# Patient Record
Sex: MALE | Race: WHITE | Employment: FULL TIME | URBAN - METROPOLITAN AREA
[De-identification: names, ages, dates, MRNs, and addresses within clinical notes are randomized per-mention and may not be internally consistent; named-entity substitution may affect disease eponyms.]

---

## 2017-01-27 ENCOUNTER — OFFICE VISIT (OUTPATIENT)
Dept: ORTHOPEDIC SURGERY | Age: 57
End: 2017-01-27

## 2017-01-27 DIAGNOSIS — S83.242D TEAR OF MEDIAL MENISCUS OF LEFT KNEE, UNSPECIFIED TEAR TYPE, UNSPECIFIED WHETHER OLD OR CURRENT TEAR, SUBSEQUENT ENCOUNTER: Primary | ICD-10-CM

## 2017-01-27 PROCEDURE — 99024 POSTOP FOLLOW-UP VISIT: CPT | Performed by: ORTHOPAEDIC SURGERY

## 2017-03-15 ENCOUNTER — OFFICE VISIT (OUTPATIENT)
Dept: ORTHOPEDIC SURGERY | Age: 57
End: 2017-03-15

## 2017-03-15 VITALS — BODY MASS INDEX: 38.36 KG/M2 | HEIGHT: 76 IN | WEIGHT: 315 LBS

## 2017-03-15 DIAGNOSIS — S83.241D OTHER TEAR OF MEDIAL MENISCUS OF RIGHT KNEE AS CURRENT INJURY, SUBSEQUENT ENCOUNTER: Primary | ICD-10-CM

## 2017-03-15 DIAGNOSIS — M17.10 ARTHRITIS OF KNEE: ICD-10-CM

## 2017-03-15 PROCEDURE — 99024 POSTOP FOLLOW-UP VISIT: CPT | Performed by: ORTHOPAEDIC SURGERY

## 2017-03-21 ENCOUNTER — HOSPITAL ENCOUNTER (OUTPATIENT)
Dept: OTHER | Age: 57
Discharge: HOME | End: 2017-03-21
Attending: INTERNAL MEDICINE | Admitting: INTERNAL MEDICINE

## 2017-03-21 DIAGNOSIS — R52 PAIN: ICD-10-CM

## 2017-03-21 DIAGNOSIS — J20.9 ACUTE BRONCHITIS, UNSPECIFIED ORGANISM: ICD-10-CM

## 2022-11-02 PROBLEM — E66.9 OBESITY: Status: ACTIVE | Noted: 2022-11-02

## 2022-11-02 PROBLEM — I48.91 NEW ONSET ATRIAL FIBRILLATION (HCC): Status: ACTIVE | Noted: 2022-11-02

## 2022-11-02 PROBLEM — E03.8 OTHER SPECIFIED HYPOTHYROIDISM: Status: ACTIVE | Noted: 2022-11-02

## 2022-11-02 PROBLEM — R29.818 SUSPECTED SLEEP APNEA: Status: ACTIVE | Noted: 2022-11-02

## 2022-11-02 NOTE — PROGRESS NOTES
[facility]   Electrophysiology Consultation   Date: 11/3/2022  Reason for Consultation: new onset atrial fibrillation   Consult Requesting Physician: Esme LEVI    Chief Complaint   Patient presents with    New Patient     Pt reports occassionally feeling palps, pt reports no other cardiac symptoms. Atrial Fibrillation    Other     Pt wants to know which pain med can be taken for headache while taking eliquis       CC: new atrial fibrillation    HPI: Beto Lucas is a 65 y.o. male with past medical history of DM, hypothyroid. Patient saw PCP for annual exam. New onset atrial fibrillation noted on EKG. . Patient was started on Eliquis . Interval History: Robinson Franks presents today to discuss his new atrial  fibrillation. 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 He does have palpitations that he has noticed are triggered by caffeine. He does fall asleep at work. He drinks very little. He states he diabetes Is not well controlled. He is having headaches that he thins may be due eliquis       Assessment and plan:   New onset atrial fibrillation. EKG today  simus rhythm      New noted at PCP office 11/01/2022 - unsure of duration   Patient has a [id number] Score of  1 (DM)    ~ started on eliquis 11/01/2022- if headache continues - stopeliquis and see if headache persists. We can switch to Xarelto if needed     ~ creatinine 0.85 11/01/2022      - Afib risk factors including age, HTN, obesity, inactivity and MEEK were discussed with patient. Risk factor modification recommended    Discussed in detail about the risk factors, pathophysiology, and treatment options including life style modifications for atrial fibrillation. Eat heart healthy diet  Minimize alcohol intake  Minimize caffeine and soda   Keep your blood pressure under control. Keep your blood sugar under control.    Stop smoking  Be active, keep your body weight optimal  Exercise on a regular basis  Manage your stress   If you snore, get evaluated for sleep apnea  Be aware of the symptoms of stroke       - Treatment options including cardioversion, rate control strategy, antiarrhythmics, anticoagulation and possible ablation were discussed with patient. Risks, benefits and alternative of each treatment options were explained. All questions answered    - will order echo, stress test, 30 day monitor     He had atrial fibrillation in doctor's office but he is in sinus today. Therefore we will have to assess for burden of the A. fib. We will do a 30-day monitor and decide about further management depending on frequency and symptoms. Antiarrhythmic drugs versus ablation can be considered. We discussed lifestyle modification including evaluation for sleep apnea and weight loss. Level 2 a stress test and echo to assess for any structural heart disease. As long as the burden of A. fib remains low, continuation of medical therapy and lifestyle modification should be acceptable. If the burden is high and if he is symptomatic, we may consider antiarrhythmic drugs or ablation. Hypothyroid    TSH 4.7 11/01/2022 - had been out of levothyroxine. Obesity  Body mass index is 41.83 kg/m². -Excessive weight is complicating assessment and treatment. It is placing patient at risk for multiple co-morbidities as well as early death and contributing to the patient's presentation.  -Discussed weight management with diet and exercise     Suspected sleep apnea    - discussed long term effects of untreated sleep apnea.        Plan:   Order echo, stress test, cardiac monitor   Follow up in 3-4 months    Patient Active Problem List    Diagnosis Date Noted    New onset atrial fibrillation ([address] 11/02/2022    Other specified hypothyroidism 11/02/2022    Obesity 11/02/2022    Suspected sleep apnea 11/02/2022    Medial meniscus tear 07/25/2016     Diagnostic studies:   ECG 11/3/22  SR   424  QTcH, 100 QRS Echo 06/27/2014   Summary    Normal left ventricle size and systolic function with an estimated ejection    fraction of 55%. Mild concentric left ventricular hypertrophy is present. Trivial mitral regurgitation is present. The left atrium is dilated. There is mild tricuspid regurgitation with RVSP estimated at 34 mmHg. I independently reviewed the cardiac diagnostic studies, ECG and relevant imaging studies. No results found for: LVEF, LVEFMODE  No results found for: TSHFT4, TSH    Physical Examination:  Vitals:    11/03/22 1037   BP: 118/78   Pulse: 70   SpO2: 97%      Wt Readings from Last 3 Encounters:   11/03/22 (!) 340 lb (154.2 kg)   03/15/17 (!) 338 lb 6.5 oz (153.5 kg)   12/22/16 (!) 338 lb 6.4 oz (153.5 kg)       Constitutional: Oriented. No distress. Head: Normocephalic and atraumatic. Mouth/Throat: Oropharynx is clear and moist.   Eyes: Conjunctivae normal. EOM are normal.   Neck: Neck supple. No rigidity. No JVD present. Cardiovascular: Normal rate, regular rhythm, S1&S2. Pulmonary/Chest: Bilateral respiratory sounds. No wheezes, No rhonchi. Abdominal: Soft. Bowel sounds present. No distension, No tenderness. Musculoskeletal: No tenderness. No edema    Lymphadenopathy: Has no cervical adenopathy. Neurological: Alert and oriented. Cranial nerve appears intact, No Gross deficit   Skin: Skin is warm and dry. No rash noted. Psychiatric: Has a normal behavior       Review of System:  [x] Full ROS obtained and negative except as mentioned in HPI    Prior to Admission medications    Medication Sig Start Date End Date Taking?  Authorizing Provider   ELIQUIS 5 MG TABS tablet  11/1/22  Yes Historical Provider, MD   metFORMIN (GLUCOPHAGE-XR) 500 MG extended release tablet Take by mouth 2 times daily 9/8/22  Yes Historical Provider, MD   levothyroxine (SYNTHROID) 50 MCG tablet Take by mouth daily 9/8/22 9/8/23 Yes Historical Provider, MD   triamterene-hydroCHLOROthiazide (DYAZIDE) 37.5-25 MG per capsule Take 1 capsule by mouth daily as needed 9/8/22  Yes Historical Provider, MD   ibuprofen (ADVIL;MOTRIN) 800 MG tablet Take 1 tablet by mouth every 6 hours as needed for Pain  Patient not taking: Reported on 11/3/2022 12/22/16   Pretty Brock MD   ibuprofen (ADVIL;MOTRIN) 800 MG tablet Take 1 tablet by mouth every 6 hours as needed for Pain  Patient not taking: Reported on 11/3/2022 6/16/16   Pretty Brock MD   acetaminophen (APAP EXTRA STRENGTH) 500 MG tablet Take 2 tablets by mouth every 6 hours as needed for Pain  Patient not taking: Reported on 11/3/2022 6/16/16   Pretty Brock MD   ibuprofen (ADVIL;MOTRIN) 200 MG tablet Take 800 mg by mouth every 6 hours as needed for Pain   Patient not taking: Reported on 11/3/2022    Historical Provider, MD       Past Medical History:   Diagnosis Date    Enlarged heart     Esophageal stricture     Hx of stress fx     knee    Meniscus tear         Past Surgical History:   Procedure Laterality Date    COLONOSCOPY      FOOT TENDON SURGERY      KNEE ARTHROSCOPY Right 06/16/16    RIGHT KNEE ARTHROSCOPY WITH PARTIAL MEDIAL MENISCECTOMY AND INTERNAL FIXATION MEDIAL TIBIAL PLATEAU STRESS FRACTURE    KNEE ARTHROSCOPY Left 12/22/2016    : LEFT KNEE ARTHROSCOPY WITH PARTIAL MEDIAL MENISCECTOMY    TONSILLECTOMY      UPPER GASTROINTESTINAL ENDOSCOPY      WISDOM TOOTH EXTRACTION         Allergies   Allergen Reactions    Adhesive Tape Rash    Merthiolate Glycerite [Thimerosal] Rash    Other      Most allergy medications cause rash    Allergy [Loratadine] Rash       Social History:  Reviewed. reports that he has never smoked. He does not have any smokeless tobacco history on file. He reports current alcohol use. He reports that he does not use drugs. Family History:  Reviewed. Reviewed. No family history of SCD. Relevant and available labs, and cardiovascular diagnostics reviewed. Reviewed.          I independently reviewed all cardiac tracing. I independently reviewed relevant and available cardiac diagnostic tests ECG, CXR, Echo, Stress test, Device interrogation, Holter, CT scan. Outside medical records via Care everywhere reviewed and summarized in H&P above. Complex medical condition with multiple medical problems affecting prognosis and outcome of EP interventions      - The patient is counseled to follow a low salt diet to assure blood pressure remains controlled for cardiovascular risk factor modification.   - The patient is counseled to avoid excess caffeine, and energy drinks as this may exacerbated ectopy and arrhythmia. - The patient is counseled to get regular exercise 3-5 times per week to control cardiovascular risk factors. - The patient is counseled to lose weigt to control cardiovascular risk factors. All questions and concerns were addressed to the patient/family. Alternatives to my treatment were discussed. I have discussed the above stated plan and the patient verbalized understanding and agreed with the plan. Scribe attestation: This note was scribed in the presence of  Trina Cordon MD by Nazario Dunne RN  I, Dr. Trina Cordon personally performed the services described in this documentation as scribed by RN in my presence, and it is both accurate and complete. NOTE: This report was transcribed using voice recognition software. Every effort was made to ensure accuracy, however, inadvertent computerized transcription errors may be present.      Trina Cordon MD, [facility], [address] [place] [address]   Office: [phone]  Fax: [phone]

## 2022-11-03 ENCOUNTER — OFFICE VISIT (OUTPATIENT)
Dept: CARDIOLOGY CLINIC | Age: 62
End: 2022-11-03
Payer: COMMERCIAL

## 2022-11-03 VITALS
BODY MASS INDEX: 38.36 KG/M2 | HEIGHT: 76 IN | DIASTOLIC BLOOD PRESSURE: 78 MMHG | SYSTOLIC BLOOD PRESSURE: 118 MMHG | HEART RATE: 70 BPM | OXYGEN SATURATION: 97 % | WEIGHT: 315 LBS

## 2022-11-03 DIAGNOSIS — E03.8 OTHER SPECIFIED HYPOTHYROIDISM: ICD-10-CM

## 2022-11-03 DIAGNOSIS — E66.01 CLASS 3 SEVERE OBESITY WITH SERIOUS COMORBIDITY AND BODY MASS INDEX (BMI) OF 40.0 TO 44.9 IN ADULT, UNSPECIFIED OBESITY TYPE (HCC): ICD-10-CM

## 2022-11-03 DIAGNOSIS — R29.818 SUSPECTED SLEEP APNEA: ICD-10-CM

## 2022-11-03 DIAGNOSIS — R94.31 EKG ABNORMALITIES: ICD-10-CM

## 2022-11-03 DIAGNOSIS — I48.91 NEW ONSET ATRIAL FIBRILLATION (HCC): Primary | ICD-10-CM

## 2022-11-03 PROCEDURE — 93000 ELECTROCARDIOGRAM COMPLETE: CPT | Performed by: INTERNAL MEDICINE

## 2022-11-03 PROCEDURE — 99204 OFFICE O/P NEW MOD 45 MIN: CPT | Performed by: INTERNAL MEDICINE

## 2022-11-03 RX ORDER — APIXABAN 5 MG/1
TABLET, FILM COATED ORAL
COMMUNITY
Start: 2022-11-01

## 2022-11-03 RX ORDER — TRIAMTERENE AND HYDROCHLOROTHIAZIDE 37.5; 25 MG/1; MG/1
1 CAPSULE ORAL DAILY PRN
COMMUNITY
Start: 2022-09-08

## 2022-11-03 RX ORDER — LEVOTHYROXINE SODIUM 0.05 MG/1
TABLET ORAL DAILY
COMMUNITY
Start: 2022-09-08 | End: 2023-09-08

## 2022-11-03 RX ORDER — METFORMIN HYDROCHLORIDE 500 MG/1
TABLET, EXTENDED RELEASE ORAL 2 TIMES DAILY
COMMUNITY
Start: 2022-09-08

## 2022-11-03 NOTE — PATIENT INSTRUCTIONS
Discussed in detail about the risk factors, pathophysiology, and treatment options including life style modifications for atrial fibrillation. Eat heart healthy diet  Minimize alcohol intake  Minimize caffeine and soda   Keep your blood pressure under control. Keep your blood sugar under control.    Stop smoking  Be active, keep your body weight optimal  Exercise on a regular basis  Manage your stress   If you snore, get evaluated for sleep apnea  Be aware of the symptoms of stroke

## 2022-11-30 ENCOUNTER — HOSPITAL ENCOUNTER (OUTPATIENT)
Dept: NON INVASIVE DIAGNOSTICS | Age: 62
Discharge: HOME | End: 2022-11-30
Payer: COMMERCIAL

## 2022-11-30 ENCOUNTER — TELEPHONE (OUTPATIENT)
Dept: CARDIOLOGY CLINIC | Age: 62
End: 2022-11-30

## 2022-11-30 DIAGNOSIS — R94.39 ABNORMAL STRESS TEST: Primary | ICD-10-CM

## 2022-11-30 DIAGNOSIS — E66.01 CLASS 3 SEVERE OBESITY WITH SERIOUS COMORBIDITY AND BODY MASS INDEX (BMI) OF 40.0 TO 44.9 IN ADULT, UNSPECIFIED OBESITY TYPE (HCC): ICD-10-CM

## 2022-11-30 DIAGNOSIS — I48.91 NEW ONSET ATRIAL FIBRILLATION (HCC): ICD-10-CM

## 2022-11-30 DIAGNOSIS — R94.31 EKG ABNORMALITIES: ICD-10-CM

## 2022-11-30 PROCEDURE — 78452 HT MUSCLE IMAGE SPECT MULT: CPT | Performed by: INTERNAL MEDICINE

## 2022-11-30 PROCEDURE — 93017 CV STRESS TEST TRACING ONLY: CPT | Performed by: INTERNAL MEDICINE

## 2022-11-30 PROCEDURE — 6360000002 HC RX W HCPCS: Performed by: INTERNAL MEDICINE

## 2022-11-30 PROCEDURE — 93306 TTE W/DOPPLER COMPLETE: CPT

## 2022-11-30 PROCEDURE — 3430000000 HC RX DIAGNOSTIC RADIOPHARMACEUTICAL: Performed by: INTERNAL MEDICINE

## 2022-11-30 PROCEDURE — A9502 TC99M TETROFOSMIN: HCPCS | Performed by: INTERNAL MEDICINE

## 2022-11-30 RX ADMIN — REGADENOSON 0.4 MG: 0.08 INJECTION, SOLUTION INTRAVENOUS at 08:39

## 2022-11-30 RX ADMIN — TETROFOSMIN 30 MILLICURIE: 1.38 INJECTION, POWDER, LYOPHILIZED, FOR SOLUTION INTRAVENOUS at 08:42

## 2022-11-30 RX ADMIN — TETROFOSMIN 10 MILLICURIE: 1.38 INJECTION, POWDER, LYOPHILIZED, FOR SOLUTION INTRAVENOUS at 07:37

## 2022-11-30 NOTE — PROGRESS NOTES
Instructed on Lexiscan Stress Test Procedure including possible side effects/ adverse reactions. Patient verbalizes  understanding and denies having any questions . See [facility]

## 2022-11-30 NOTE — TELEPHONE ENCOUNTER
Abnormal stress test  Needs [facility]  Hold Eliquis 48 hours prior  Hold Metformin the morning of

## 2022-12-01 NOTE — TELEPHONE ENCOUNTER
The patient's insurance now requires a Cardiac CTA prior to authorizing C. Please advise. Thank you.

## 2022-12-02 RX ORDER — METOPROLOL TARTRATE 50 MG/1
50 TABLET, FILM COATED ORAL 2 TIMES DAILY
Qty: 5 TABLET | Refills: 0 | Status: SHIPPED | OUTPATIENT
Start: 2022-12-02

## 2022-12-02 NOTE — TELEPHONE ENCOUNTER
Discussed at length with Jamie Logan, he is in agreement  Will schedule    Arrive main entrance, check in on the first floor- take elevator to outpatient registration  1200 arrival  NPO 4 hours prior  No caffeine/strenuous exercise 24 hours prior  No smoking 4 hours

## 2022-12-02 NOTE — TELEPHONE ENCOUNTER
[facility] will order coronary cta  He will need to take Metoprolol Tartrate 50 mg for 4 doses prior to get HR down to goal of <60    Attempted to call home number and mobile to discuss, no answer lvm on both for callback and ask for me

## 2022-12-09 ENCOUNTER — TELEPHONE (OUTPATIENT)
Dept: CARDIOLOGY CLINIC | Age: 62
End: 2022-12-09

## 2022-12-09 ENCOUNTER — HOSPITAL ENCOUNTER (OUTPATIENT)
Dept: CT IMAGING | Age: 62
Discharge: HOME | End: 2022-12-09
Payer: COMMERCIAL

## 2022-12-09 VITALS — OXYGEN SATURATION: 97 % | HEART RATE: 51 BPM | SYSTOLIC BLOOD PRESSURE: 109 MMHG | DIASTOLIC BLOOD PRESSURE: 56 MMHG

## 2022-12-09 DIAGNOSIS — I48.91 NEW ONSET ATRIAL FIBRILLATION (HCC): ICD-10-CM

## 2022-12-09 DIAGNOSIS — R94.39 ABNORMAL STRESS TEST: ICD-10-CM

## 2022-12-09 LAB
GFR SERPL CREATININE-BSD FRML MDRD: >60 ML/MIN/{1.73_M2}
PERFORMED ON: ABNORMAL
POC CREATININE: 0.7 MG/DL (ref 0.8–1.3)
POC SAMPLE TYPE: ABNORMAL

## 2022-12-09 PROCEDURE — 82565 ASSAY OF CREATININE: CPT

## 2022-12-09 PROCEDURE — 6360000004 HC RX CONTRAST MEDICATION: Performed by: INTERNAL MEDICINE

## 2022-12-09 PROCEDURE — 75574 CT ANGIO HRT W/3D IMAGE: CPT

## 2022-12-09 PROCEDURE — 6370000000 HC RX 637 (ALT 250 FOR IP): Performed by: RADIOLOGY

## 2022-12-09 RX ORDER — NITROGLYCERIN 0.4 MG/1
0.4 TABLET SUBLINGUAL ONCE
Status: COMPLETED | OUTPATIENT
Start: 2022-12-09 | End: 2022-12-09

## 2022-12-09 RX ADMIN — IOPAMIDOL 80 ML: 755 INJECTION, SOLUTION INTRAVENOUS at 12:35

## 2022-12-09 RX ADMIN — NITROGLYCERIN 0.4 MG: 0.4 TABLET SUBLINGUAL at 12:34

## 2022-12-09 NOTE — TELEPHONE ENCOUNTER
Spoke with Ford Delacruz. Discussed his CT scan results which showed DAVID thrombus. He states he has not missed his Eliquis except for one dose. Per [facility] will schedule patient for a PREETI, he states he has required anesthesia for endoscopies in the past and will have him scheduled with anesthesia.

## 2022-12-09 NOTE — PROGRESS NOTES
Pt here for Cardiac CTA test.  Administered 0 of metoprolol to achieve desired heart rate of 60 BPM.  Administered 0.4mg nitroglycerin sublingual for exam.  Pt tolerated well. VSS. See flow sheet. Pt transferred to back home .

## 2022-12-09 NOTE — LETTER
[facility]  EP Procedure Sheet    12/9/22  Jed Marie  1960  EP Procedures  [] Pacemaker implant (single/dual) [] EP Study   [] ICD implant (single/dual) [] Atrial flutter ablation (PREETI Y/N)   [] Biv implant ICD [] Tilt Table   [] Biv implant PPM [] Atrial fibrillation ablation (PREETI Yes)   [] Generator Change (PPM/ICD/BiV) [] SVT ablation   [] Lead revision (RV/LA/RA) (<1 month) [] PVC ablation     [] Lead extraction +/- upgrade (BiV/PPM/ICD) [] VT Ischemic/ non-ischemic   [] Loop implant/ removal [] VT RVOT   [] Cardioversion [] VT Left sided   [x] PREETI [] AVN ablation   Equipment  [] [employer]  [] ASAF Mapping System   [] [address] [] [address]   [] [employer] [] CryoAblation   [] [employer]ik [] Laser Lead Extraction   EP Procedures Scheduling Request  # hours Requested  []1 []2 []2-4 [] 4-6 Scheduled  Date:   Specific Day  Completed    Anesthesia [x]yes []no F/u Date:   CT surgery backup []yes []no COVID     Overnight stay      Performing MD  []RMM [x]MXA   []MKW [] CMV First vs repeat   []1st [] 2nd [] 3rd   Pre-Procedure Labs / Imaging  [] PT/INR [] Type & cross   [] CBC [] Units PRBC   [] BMP/Mg [] Units FFP   [] Venogram [] Cardiac CTA for Pulmonary vein mapping     RN INITIALS: RA    Patient Instructions  Do not eat or drink after midnight the night prior to procedure  Dx:PAF ICD-10 code: I48.0

## 2022-12-12 ENCOUNTER — TELEPHONE (OUTPATIENT)
Dept: CARDIOLOGY CLINIC | Age: 62
End: 2022-12-12

## 2022-12-12 NOTE — TELEPHONE ENCOUNTER
Regarding: Follow to today's heart cat-scan  ----- Message from Florence Chowdhury RN sent at 12/12/2022  8:44 AM EST -----       ----- Message from Lm Rudolph to Vane Villasenor MD sent at 12/9/2022  4:47 PM -----   Hi, I spoke with Trevor Henson earlier with the results. She was going to check with scheduling and call me back soon with a date for next week for getting an endo-cardio ultrasound (or something sort of similar to that!)  I have not heard back from her, and just wanted to to see how things were progressing!   Thanks,  Malgorzata Hector    [phone] (cell)  [phone] (home)    I am currently on hold for the last 15 minutes with cardiology

## 2022-12-15 ENCOUNTER — ANESTHESIA EVENT (OUTPATIENT)
Dept: CARDIAC CATH/INVASIVE PROCEDURES | Age: 62
End: 2022-12-15
Payer: COMMERCIAL

## 2022-12-15 ENCOUNTER — ANESTHESIA (OUTPATIENT)
Dept: CARDIAC CATH/INVASIVE PROCEDURES | Age: 62
End: 2022-12-15
Payer: COMMERCIAL

## 2022-12-15 ENCOUNTER — HOSPITAL ENCOUNTER (OUTPATIENT)
Dept: CARDIAC CATH/INVASIVE PROCEDURES | Age: 62
Discharge: HOME | End: 2022-12-15
Attending: INTERNAL MEDICINE | Admitting: INTERNAL MEDICINE
Payer: COMMERCIAL

## 2022-12-15 VITALS
DIASTOLIC BLOOD PRESSURE: 72 MMHG | WEIGHT: 315 LBS | RESPIRATION RATE: 16 BRPM | SYSTOLIC BLOOD PRESSURE: 120 MMHG | HEIGHT: 75 IN | HEART RATE: 63 BPM | TEMPERATURE: 98.8 F | BODY MASS INDEX: 39.17 KG/M2

## 2022-12-15 PROCEDURE — 2500000003 HC RX 250 WO HCPCS: Performed by: NURSE ANESTHETIST, CERTIFIED REGISTERED

## 2022-12-15 PROCEDURE — 93312 ECHO TRANSESOPHAGEAL: CPT

## 2022-12-15 PROCEDURE — 7100000010 HC PHASE II RECOVERY - FIRST 15 MIN

## 2022-12-15 PROCEDURE — 93325 DOPPLER ECHO COLOR FLOW MAPG: CPT

## 2022-12-15 PROCEDURE — 6360000002 HC RX W HCPCS: Performed by: NURSE ANESTHETIST, CERTIFIED REGISTERED

## 2022-12-15 PROCEDURE — 3700000001 HC ADD 15 MINUTES (ANESTHESIA)

## 2022-12-15 PROCEDURE — 3700000000 HC ANESTHESIA ATTENDED CARE

## 2022-12-15 PROCEDURE — 2580000003 HC RX 258: Performed by: NURSE ANESTHETIST, CERTIFIED REGISTERED

## 2022-12-15 PROCEDURE — 93320 DOPPLER ECHO COMPLETE: CPT

## 2022-12-15 RX ORDER — SODIUM CHLORIDE 0.9 % (FLUSH) 0.9 %
5-40 SYRINGE (ML) INJECTION PRN
Status: DISCONTINUED | OUTPATIENT
Start: 2022-12-15 | End: 2022-12-15 | Stop reason: HOSPADM

## 2022-12-15 RX ORDER — LIDOCAINE HYDROCHLORIDE 20 MG/ML
INJECTION, SOLUTION EPIDURAL; INFILTRATION; INTRACAUDAL; PERINEURAL PRN
Status: DISCONTINUED | OUTPATIENT
Start: 2022-12-15 | End: 2022-12-15 | Stop reason: SDUPTHER

## 2022-12-15 RX ORDER — SODIUM CHLORIDE 9 MG/ML
INJECTION, SOLUTION INTRAVENOUS CONTINUOUS PRN
Status: DISCONTINUED | OUTPATIENT
Start: 2022-12-15 | End: 2022-12-15 | Stop reason: SDUPTHER

## 2022-12-15 RX ORDER — PROPOFOL 10 MG/ML
INJECTION, EMULSION INTRAVENOUS PRN
Status: DISCONTINUED | OUTPATIENT
Start: 2022-12-15 | End: 2022-12-15 | Stop reason: SDUPTHER

## 2022-12-15 RX ORDER — KETAMINE HCL IN NACL, ISO-OSM 100MG/10ML
SYRINGE (ML) INJECTION PRN
Status: DISCONTINUED | OUTPATIENT
Start: 2022-12-15 | End: 2022-12-15 | Stop reason: SDUPTHER

## 2022-12-15 RX ORDER — GLYCOPYRROLATE 0.2 MG/ML
INJECTION INTRAMUSCULAR; INTRAVENOUS PRN
Status: DISCONTINUED | OUTPATIENT
Start: 2022-12-15 | End: 2022-12-15 | Stop reason: SDUPTHER

## 2022-12-15 RX ADMIN — GLYCOPYRROLATE 0.2 MG: 0.2 INJECTION, SOLUTION INTRAMUSCULAR; INTRAVENOUS at 13:58

## 2022-12-15 RX ADMIN — PROPOFOL 50 MG: 10 INJECTION, EMULSION INTRAVENOUS at 14:00

## 2022-12-15 RX ADMIN — PROPOFOL 50 MG: 10 INJECTION, EMULSION INTRAVENOUS at 14:06

## 2022-12-15 RX ADMIN — Medication 30 MG: at 13:58

## 2022-12-15 RX ADMIN — LIDOCAINE HYDROCHLORIDE 100 MG: 20 INJECTION, SOLUTION EPIDURAL; INFILTRATION; INTRACAUDAL; PERINEURAL at 13:58

## 2022-12-15 RX ADMIN — PROPOFOL 50 MG: 10 INJECTION, EMULSION INTRAVENOUS at 14:08

## 2022-12-15 RX ADMIN — Medication 20 MG: at 14:04

## 2022-12-15 RX ADMIN — SODIUM CHLORIDE: 9 INJECTION, SOLUTION INTRAVENOUS at 13:54

## 2022-12-15 RX ADMIN — PROPOFOL 50 MG: 10 INJECTION, EMULSION INTRAVENOUS at 13:58

## 2022-12-15 RX ADMIN — PROPOFOL 50 MG: 10 INJECTION, EMULSION INTRAVENOUS at 14:04

## 2022-12-15 ASSESSMENT — LIFESTYLE VARIABLES: SMOKING_STATUS: 0

## 2022-12-15 NOTE — PROCEDURES
[facility]     Electrophysiology Procedure Note       Date of Procedure: 12/15/2022  Patient's Name: Purvi Mae  YOB: 1960   Medical Record Number: [id number]  Procedure Performed by: Lisette Tuttle MD    Procedures performed:    Trans-esophageal echocardiography  Indication of the procedure: DAVID thrombus      Details of procedure: The patient was brought to the cath lab area in a fasting and non-sedated state. The risks, benefits and alternatives of the procedure were discussed with the patient. The patient opted to proceed with the procedure. Written informed consent was signed and placed in the chart. A timeout protocol was completed to identify the patient and the procedure being performed. IV sedation was provided with IV Versed, Fentanyl initially and PREETI was performed     Preliminary PREETI results are:   Normal LV function  No DAVID or LA thrombus seen. Patient tolerated the procedure and no immediate complications noted.      Lisette Tuttle MD, MPH  [facility]   Office: [phone]  Fax: [phone]

## 2022-12-15 NOTE — H&P
[facility]   Electrophysiology   Date: 12/15/2022  Reason for Consultation: new onset atrial fibrillation   Consult Requesting Physician: Aisha LEVI      CC: new atrial fibrillation    HPI: Ellie Nicholson is a 65 y.o. male with past medical history of DM, hypothyroid. Patient saw PCP for annual exam. New onset atrial fibrillation noted on EKG. . Patient was started on Eliquis . 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 He does have palpitations that he has noticed are triggered by caffeine. He does fall asleep at work. He drinks very little. He states he diabetes Is not well controlled. He is having headaches that he thins may be due eliquis     A CT scan has been done and was suggestive of thrombus in DAVID. He is here for PREETI to rule out DAVID thrombus. Assessment and plan:   New onset atrial fibrillation. EKG today  simus rhythm      New noted at PCP office 11/01/2022 - unsure of duration   Patient has a [id number] Score of  1 (DM)    ~ started on eliquis 11/01/2022- if headache continues - stopeliquis and see if headache persists. We can switch to Xarelto if needed     ~ creatinine 0.85 11/01/2022      - Afib risk factors including age, HTN, obesity, inactivity and MEEK were discussed with patient. Risk factor modification recommended    Discussed in detail about the risk factors, pathophysiology, and treatment options including life style modifications for atrial fibrillation. ? DAVID thrombus by CT. Discussed PREETI with patient. Risks, benefits and alternative of procedure were explained. All questions answered. Patient understood and would like to proceed. Eat heart healthy diet  Minimize alcohol intake  Minimize caffeine and soda   Keep your blood pressure under control. Keep your blood sugar under control.    Stop smoking  Be active, keep your body weight optimal  Exercise on a regular basis  Manage your stress   If you snore, get evaluated for sleep apnea  Be aware of the symptoms of stroke       - Treatment options including cardioversion, rate control strategy, antiarrhythmics, anticoagulation and possible ablation were discussed with patient. Risks, benefits and alternative of each treatment options were explained. All questions answered    - will order echo, stress test, 30 day monitor     He had atrial fibrillation in doctor's office but he is in sinus today. Therefore we will have to assess for burden of the A. fib. We will do a 30-day monitor and decide about further management depending on frequency and symptoms. Antiarrhythmic drugs versus ablation can be considered. We discussed lifestyle modification including evaluation for sleep apnea and weight loss. Level 2 a stress test and echo to assess for any structural heart disease. As long as the burden of A. fib remains low, continuation of medical therapy and lifestyle modification should be acceptable. If the burden is high and if he is symptomatic, we may consider antiarrhythmic drugs or ablation. Hypothyroid    TSH 4.7 11/01/2022 - had been out of levothyroxine. Obesity  Body mass index is 42.5 kg/m². -Excessive weight is complicating assessment and treatment. It is placing patient at risk for multiple co-morbidities as well as early death and contributing to the patient's presentation.  -Discussed weight management with diet and exercise     Suspected sleep apnea    - discussed long term effects of untreated sleep apnea.        Plan:   Order echo, stress test, cardiac monitor   Follow up in 3-4 months    Patient Active Problem List    Diagnosis Date Noted    New onset atrial fibrillation ([facility] [address] 11/02/2022    Other specified hypothyroidism 11/02/2022    Obesity 11/02/2022    Suspected sleep apnea 11/02/2022    Medial meniscus tear 07/25/2016     Diagnostic studies:   ECG 11/3/22  SR   424  QTcH, 100 QRS     Echo 06/27/2014   Summary    Normal left ventricle size and systolic function with an estimated ejection    fraction of 55%. Mild concentric left ventricular hypertrophy is present. Trivial mitral regurgitation is present. The left atrium is dilated. There is mild tricuspid regurgitation with RVSP estimated at 34 mmHg. I independently reviewed the cardiac diagnostic studies, ECG and relevant imaging studies. No results found for: LVEF, LVEFMODE  No results found for: TSHFT4, TSH    Physical Examination:  Vitals:    12/15/22 1300   BP: 120/72   Pulse: 63   Resp: 16   Temp: 98.8 °F (37.1 °C)      Wt Readings from Last 3 Encounters:   12/15/22 (!) 340 lb (154.2 kg)   11/03/22 (!) 340 lb (154.2 kg)   03/15/17 (!) 338 lb 6.5 oz (153.5 kg)       Constitutional: Oriented. No distress. Head: Normocephalic and atraumatic. Mouth/Throat: Oropharynx is clear and moist.   Eyes: Conjunctivae normal. EOM are normal.   Neck: Neck supple. No rigidity. No JVD present. Cardiovascular: Normal rate, regular rhythm, S1&S2. Pulmonary/Chest: Bilateral respiratory sounds. No wheezes, No rhonchi. Abdominal: Soft. Bowel sounds present. No distension, No tenderness. Musculoskeletal: No tenderness. No edema    Lymphadenopathy: Has no cervical adenopathy. Neurological: Alert and oriented. Cranial nerve appears intact, No Gross deficit   Skin: Skin is warm and dry. No rash noted. Psychiatric: Has a normal behavior       Review of System:  [x] Full ROS obtained and negative except as mentioned in HPI    Prior to Admission medications    Medication Sig Start Date End Date Taking?  Authorizing Provider   metoprolol tartrate (LOPRESSOR) 50 MG tablet Take 1 tablet by mouth 2 times daily Prior to Coronary CTA  Patient not taking: Reported on 12/15/2022 12/2/22   Chen Gregory DO   ELIQUIS 5 MG TABS tablet  11/1/22   Historical Provider, MD   metFORMIN (GLUCOPHAGE-XR) 500 MG extended release tablet Take by mouth 2 times daily 9/8/22   Historical Provider, MD levothyroxine (SYNTHROID) 50 MCG tablet Take by mouth daily 9/8/22 9/8/23  Historical Provider, MD   triamterene-hydroCHLOROthiazide (Tim Franklyn) 37.5-25 MG per capsule Take 1 capsule by mouth daily as needed 9/8/22   Historical Provider, MD   acetaminophen (APAP EXTRA STRENGTH) 500 MG tablet Take 2 tablets by mouth every 6 hours as needed for Pain  Patient not taking: Reported on 11/3/2022 6/16/16   Sandie Gallardo MD       Past Medical History:   Diagnosis Date    Enlarged heart     Esophageal stricture     Hx of stress fx     knee    Meniscus tear         Past Surgical History:   Procedure Laterality Date    COLONOSCOPY      FOOT TENDON SURGERY      KNEE ARTHROSCOPY Right 06/16/16    RIGHT KNEE ARTHROSCOPY WITH PARTIAL MEDIAL MENISCECTOMY AND INTERNAL FIXATION MEDIAL TIBIAL PLATEAU STRESS FRACTURE    KNEE ARTHROSCOPY Left 12/22/2016    : LEFT KNEE ARTHROSCOPY WITH PARTIAL MEDIAL MENISCECTOMY    TONSILLECTOMY      UPPER GASTROINTESTINAL ENDOSCOPY      WISDOM TOOTH EXTRACTION         Allergies   Allergen Reactions    Adhesive Tape Rash    Merthiolate Glycerite [Thimerosal] Rash    Other      Most allergy medications cause rash    Allergy [Loratadine] Rash       Social History:  Reviewed. reports that he has never smoked. He does not have any smokeless tobacco history on file. He reports current alcohol use. He reports that he does not use drugs. Family History:  Reviewed. Reviewed. No family history of SCD. Relevant and available labs, and cardiovascular diagnostics reviewed. Reviewed. I independently reviewed all cardiac tracing. I independently reviewed relevant and available cardiac diagnostic tests ECG, CXR, Echo, Stress test, Device interrogation, Holter, CT scan. Outside medical records via Care everywhere reviewed and summarized in H&P above.     Complex medical condition with multiple medical problems affecting prognosis and outcome of EP interventions    - The patient is counseled to follow a low salt diet to assure blood pressure remains controlled for cardiovascular risk factor modification.   - The patient is counseled to avoid excess caffeine, and energy drinks as this may exacerbated ectopy and arrhythmia. - The patient is counseled to get regular exercise 3-5 times per week to control cardiovascular risk factors. - The patient is counseled to lose weigt to control cardiovascular risk factors. All questions and concerns were addressed to the patient/family. Alternatives to my treatment were discussed. I have discussed the above stated plan and the patient verbalized understanding and agreed with the plan. NOTE: This report was transcribed using voice recognition software. Every effort was made to ensure accuracy, however, inadvertent computerized transcription errors may be present. H&P Update    I have reviewed the history and physical and examined the patient and updated with relevant changes. Consent: I have discussed with the patient and/or the patient representative the indication, alternatives, and the possible risks and/or complications of the planned procedure and the anesthesia methods. The patient and/or patient representative appear to understand and agree to proceed. Vitals:    12/15/22 1300   BP: 120/72   Pulse: 63   Resp: 16   Temp: 98.8 °F (37.1 °C)     Prior to Admission medications    Medication Sig Start Date End Date Taking?  Authorizing Provider   metoprolol tartrate (LOPRESSOR) 50 MG tablet Take 1 tablet by mouth 2 times daily Prior to Coronary CTA  Patient not taking: Reported on 12/15/2022 12/2/22   Adele Vaughn DO   ELIQUIS 5 MG TABS tablet  11/1/22   Historical Provider, MD   metFORMIN (GLUCOPHAGE-XR) 500 MG extended release tablet Take by mouth 2 times daily 9/8/22   Historical Provider, MD   levothyroxine (SYNTHROID) 50 MCG tablet Take by mouth daily 9/8/22 9/8/23  Historical Provider, MD   triamterene-hydroCHLOROthiazide (DYAZIDE) 37.5-25 MG per capsule Take 1 capsule by mouth daily as needed 9/8/22   Historical Provider, MD   acetaminophen (APAP EXTRA STRENGTH) 500 MG tablet Take 2 tablets by mouth every 6 hours as needed for Pain  Patient not taking: Reported on 11/3/2022 6/16/16   Blayne Fair MD     Past Medical History:   Diagnosis Date    Enlarged heart     Esophageal stricture     Hx of stress fx     knee    Meniscus tear      Past Surgical History:   Procedure Laterality Date    COLONOSCOPY      FOOT TENDON SURGERY      KNEE ARTHROSCOPY Right 06/16/16    RIGHT KNEE ARTHROSCOPY WITH PARTIAL MEDIAL MENISCECTOMY AND INTERNAL FIXATION MEDIAL TIBIAL PLATEAU STRESS FRACTURE    KNEE ARTHROSCOPY Left 12/22/2016    : LEFT KNEE ARTHROSCOPY WITH PARTIAL MEDIAL MENISCECTOMY    TONSILLECTOMY      UPPER GASTROINTESTINAL ENDOSCOPY      WISDOM TOOTH EXTRACTION       Allergies   Allergen Reactions    Adhesive Tape Rash    Merthiolate Glycerite [Thimerosal] Rash    Other      Most allergy medications cause rash    Allergy [Loratadine] Rash       Documentation and Exam:   I have personally completed a history, physical exam & review of systems for this patient (see notes).     Sedation will be provided by anesthesia team.     Electronically signed by Rachna Roberto MD on 12/15/2022 at 2:11 PM

## 2022-12-15 NOTE — ANESTHESIA PRE PROCEDURE
Department of Anesthesiology  Preprocedure Note       Name:  Tyler Herrera   Age:  65 y.o.  :  1960                                          MRN:  [id number]         Date:  12/15/2022      Surgeon: * Surgery not found *    Procedure:     Medications prior to admission:   Prior to Admission medications    Medication Sig Start Date End Date Taking? Authorizing Provider   metoprolol tartrate (LOPRESSOR) 50 MG tablet Take 1 tablet by mouth 2 times daily Prior to Coronary CTA 22   Simba Mera DO   ELIQUIS 5 MG TABS tablet  22   Historical Provider, MD   metFORMIN (GLUCOPHAGE-XR) 500 MG extended release tablet Take by mouth 2 times daily 22   Historical Provider, MD   levothyroxine (SYNTHROID) 50 MCG tablet Take by mouth daily 22  Historical Provider, MD   triamterene-hydroCHLOROthiazide (DYAZIDE) 37.5-25 MG per capsule Take 1 capsule by mouth daily as needed 22   Historical Provider, MD   acetaminophen (APAP EXTRA STRENGTH) 500 MG tablet Take 2 tablets by mouth every 6 hours as needed for Pain  Patient not taking: Reported on 11/3/2022 6/16/16   Kike Parsons MD       Current medications:    No current facility-administered medications for this encounter. Current Outpatient Medications   Medication Sig Dispense Refill    metoprolol tartrate (LOPRESSOR) 50 MG tablet Take 1 tablet by mouth 2 times daily Prior to Coronary CTA 5 tablet 0    ELIQUIS 5 MG TABS tablet       metFORMIN (GLUCOPHAGE-XR) 500 MG extended release tablet Take by mouth 2 times daily      levothyroxine (SYNTHROID) 50 MCG tablet Take by mouth daily      triamterene-hydroCHLOROthiazide (DYAZIDE) 37.5-25 MG per capsule Take 1 capsule by mouth daily as needed      acetaminophen (APAP EXTRA STRENGTH) 500 MG tablet Take 2 tablets by mouth every 6 hours as needed for Pain (Patient not taking: Reported on 11/3/2022) 120 tablet 3       Allergies:     Allergies   Allergen Reactions    Adhesive Tape Rash    Merthiolate Glycerite [Thimerosal] Rash    Other      Most allergy medications cause rash    Allergy [Loratadine] Rash       Problem List:    Patient Active Problem List   Diagnosis Code    Medial meniscus tear S83.249A    New onset atrial fibrillation (HCC) I48.91    Other specified hypothyroidism E03.8    Obesity E66.9    Suspected sleep apnea R29.818       Past Medical History:        Diagnosis Date    Enlarged heart     Esophageal stricture     Hx of stress fx     knee    Meniscus tear        Past Surgical History:        Procedure Laterality Date    COLONOSCOPY      FOOT TENDON SURGERY      KNEE ARTHROSCOPY Right 06/16/16    RIGHT KNEE ARTHROSCOPY WITH PARTIAL MEDIAL MENISCECTOMY AND INTERNAL FIXATION MEDIAL TIBIAL PLATEAU STRESS FRACTURE    KNEE ARTHROSCOPY Left 12/22/2016    : LEFT KNEE ARTHROSCOPY WITH PARTIAL MEDIAL MENISCECTOMY    TONSILLECTOMY      UPPER GASTROINTESTINAL ENDOSCOPY      WISDOM TOOTH EXTRACTION         Social History:    Social History     Tobacco Use    Smoking status: Never    Smokeless tobacco: Not on file   Substance Use Topics    Alcohol use: Yes     Alcohol/week: 0.0 standard drinks     Comment: minimal                                Counseling given: Not Answered      Vital Signs (Current): There were no vitals filed for this visit.                                            BP Readings from Last 3 Encounters:   12/09/22 (!) 109/56   11/03/22 118/78   12/22/16 120/75       NPO Status:                                                                                 BMI:   Wt Readings from Last 3 Encounters:   11/03/22 (!) 340 lb (154.2 kg)   03/15/17 (!) 338 lb 6.5 oz (153.5 kg)   12/22/16 (!) 338 lb 6.4 oz (153.5 kg)     There is no height or weight on file to calculate BMI.    CBC:   Lab Results   Component Value Date/Time    WBC 6.4 12/22/2016 06:44 AM    RBC 4.90 12/22/2016 06:44 AM    HGB 14.3 12/22/2016 06:44 AM    HCT 43.3 12/22/2016 06:44 AM    MCV 88.4 12/22/2016 06:44 AM    RDW 13.3 12/22/2016 06:44 AM     12/22/2016 06:44 AM       CMP:   Lab Results   Component Value Date/Time     12/22/2016 06:44 AM    K 3.8 12/22/2016 06:44 AM     12/22/2016 06:44 AM    CO2 26 12/22/2016 06:44 AM    BUN 15 12/22/2016 06:44 AM    CREATININE 0.7 12/09/2022 12:25 PM    CREATININE 0.7 12/22/2016 06:44 AM    GFRAA >60 12/22/2016 06:44 AM    AGRATIO 2.0 12/22/2016 06:44 AM    LABGLOM >60 12/09/2022 12:25 PM    GLUCOSE 132 12/22/2016 06:44 AM    PROT 6.3 12/22/2016 06:44 AM    CALCIUM 8.9 12/22/2016 06:44 AM    BILITOT 0.6 12/22/2016 06:44 AM    ALKPHOS 59 12/22/2016 06:44 AM    AST 24 12/22/2016 06:44 AM    ALT 33 12/22/2016 06:44 AM       POC Tests: No results for input(s): POCGLU, POCNA, POCK, POCCL, POCBUN, POCHEMO, POCHCT in the last 72 hours. Coags: No results found for: PROTIME, INR, APTT    HCG (If Applicable): No results found for: PREGTESTUR, PREGSERUM, HCG, HCGQUANT     ABGs: No results found for: PHART, PO2ART, IYU3KNV, ZII0LKK, BEART, K5NFEXAA     Type & Screen (If Applicable):  No results found for: LABABO, LABRH    Drug/Infectious Status (If Applicable):  No results found for: HIV, HEPCAB    COVID-19 Screening (If Applicable): No results found for: COVID19        Anesthesia Evaluation  Patient summary reviewed and Nursing notes reviewed no history of anesthetic complications:   Airway: Mallampati: III  TM distance: >3 FB   Neck ROM: full  Mouth opening: > = 3 FB   Dental: normal exam         Pulmonary:normal exam  breath sounds clear to auscultation  (+) sleep apnea (suspected):      (-) COPD, asthma and not a current smoker                           Cardiovascular:    (+) hypertension:, CAD (CTA 2022: CAD RADS category 2 stenosis in the proximal LAD.  Otherwise patent coronary arteries):, dysrhythmias (PAF, on bb and ac as outpt): atrial fibrillation,     (-) past MI, CABG/stent,  angina and  CHF (echo 2022 EF 55-60 no rwma)    ECG reviewed  Rhythm: regular  Rate: normal  Echocardiogram reviewed  Stress test reviewed  Cleared by cardiology     Beta Blocker:  Dose within 24 Hrs         Neuro/Psych:   Negative Neuro/Psych ROS     (-) seizures, TIA and CVA           GI/Hepatic/Renal:   (+) GERD (esophageal stricture hx):, morbid obesity     (-) liver disease and no renal disease       Endo/Other:    (+) Diabetes (a1c 7.9)Type II DM, , hypothyroidism::., .                 Abdominal:             Vascular: Other Findings:           Anesthesia Plan      MAC     ASA 3       Induction: intravenous. Anesthetic plan and risks discussed with patient. Plan discussed with CRNA.                     Armando Eckert MD   12/15/2022

## 2022-12-15 NOTE — ANESTHESIA POSTPROCEDURE EVALUATION
Department of Anesthesiology  Postprocedure Note    Patient: Mckay Pedraza  MRN: [id number]  YOB: 1960  Date of evaluation: 12/15/2022      Procedure Summary     Date: 12/15/22 Room / Location: [facility] Cath Lab; [facility] Echocardiography    Anesthesia Start: 1355 Anesthesia Stop: 2395    Procedure: ECHO PREETI IN CARDIAC CATH Diagnosis: Paroxysmal atrial fibrillation    Scheduled Providers:  Responsible Provider: Navneet Calderón MD    Anesthesia Type: MAC ASA Status: 3          Anesthesia Type: No value filed.     Arturo Phase I:      Arturo Phase II:        Anesthesia Post Evaluation    Patient location during evaluation: PACU  Patient participation: complete - patient participated  Level of consciousness: awake and alert  Airway patency: patent  Nausea & Vomiting: no vomiting and no nausea  Complications: no  Cardiovascular status: hemodynamically stable  Respiratory status: acceptable  Hydration status: stable

## 2022-12-15 NOTE — DISCHARGE INSTRUCTIONS
PREETI DISCHARGE INSTRUCTIONS    No driving for 24 hours. We strongly recommend that a responsible adult stay with you for the next 24 hours. Continue Medications.     Advance diet as tolerated    Contact physician office  for following symptoms:  Fever  Difficulty swallowing  Chest pain  Difficulty breathing  Bleeding

## 2022-12-16 ENCOUNTER — OFFICE VISIT (OUTPATIENT)
Dept: PULMONOLOGY | Age: 62
End: 2022-12-16
Payer: COMMERCIAL

## 2022-12-16 VITALS
WEIGHT: 315 LBS | BODY MASS INDEX: 39.17 KG/M2 | HEIGHT: 75 IN | HEART RATE: 85 BPM | OXYGEN SATURATION: 99 % | SYSTOLIC BLOOD PRESSURE: 130 MMHG | DIASTOLIC BLOOD PRESSURE: 72 MMHG

## 2022-12-16 DIAGNOSIS — E66.01 CLASS 3 SEVERE OBESITY WITH SERIOUS COMORBIDITY AND BODY MASS INDEX (BMI) OF 40.0 TO 44.9 IN ADULT, UNSPECIFIED OBESITY TYPE (HCC): ICD-10-CM

## 2022-12-16 DIAGNOSIS — I48.91 NEW ONSET ATRIAL FIBRILLATION (HCC): ICD-10-CM

## 2022-12-16 DIAGNOSIS — G47.33 OSA (OBSTRUCTIVE SLEEP APNEA): Primary | ICD-10-CM

## 2022-12-16 PROCEDURE — 99204 OFFICE O/P NEW MOD 45 MIN: CPT | Performed by: INTERNAL MEDICINE

## 2022-12-16 ASSESSMENT — SLEEP AND FATIGUE QUESTIONNAIRES
HOW LIKELY ARE YOU TO NOD OFF OR FALL ASLEEP IN A CAR, WHILE STOPPED FOR A FEW MINUTES IN TRAFFIC: 0
HOW LIKELY ARE YOU TO NOD OFF OR FALL ASLEEP WHEN YOU ARE A PASSENGER IN A CAR FOR AN HOUR WITHOUT A BREAK: 1
HOW LIKELY ARE YOU TO NOD OFF OR FALL ASLEEP WHILE SITTING QUIETLY AFTER LUNCH WITHOUT ALCOHOL: 1
HOW LIKELY ARE YOU TO NOD OFF OR FALL ASLEEP WHILE SITTING AND TALKING TO SOMEONE: 0
HOW LIKELY ARE YOU TO NOD OFF OR FALL ASLEEP WHILE LYING DOWN TO REST IN THE AFTERNOON WHEN CIRCUMSTANCES PERMIT: 2
HOW LIKELY ARE YOU TO NOD OFF OR FALL ASLEEP WHILE SITTING INACTIVE IN A PUBLIC PLACE: 1
HOW LIKELY ARE YOU TO NOD OFF OR FALL ASLEEP WHILE WATCHING TV: 2
HOW LIKELY ARE YOU TO NOD OFF OR FALL ASLEEP WHILE SITTING AND READING: 1
ESS TOTAL SCORE: 8

## 2022-12-16 NOTE — PROGRESS NOTES
PULMONARY OFFICE NEW PATIENT VISIT    CONSULTING PHYSICIAN:  Radhames Martin MD , Jeffry Barbosa MD     REASON FOR VISIT:   Chief Complaint   Patient presents with    New Patient     Dr. Oren Bradford    Snoring    Daytime Sleepiness       DATE OF VISIT: 12/16/2022    HISTORY OF PRESENT ILLNESS: 65y.o. year old male comes in to the pulmonary office for the first time. Patient was recently diagnosed to have paroxysmal atrial fibrillation. He reported to the cardiologist that he has poor quality sleep with snoring, gasping, choking at nighttime. Sleep remains inconsistent and he is barely able to get 4 to 5 hours of sleep. 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 Feels unrefreshed in the morning and tired during the day. Does have a.m. dry mouth. Denies a.m. headaches. Has gained weight over the last few years. Both his mother and brother have sleep apnea and used CPAP machine. Sleep Medicine 12/16/2022   Sitting and reading 1   Watching TV 2   Sitting, inactive in a public place (e.g. a theatre or a meeting) 1   As a passenger in a car for an hour without a break 1   Lying down to rest in the afternoon when circumstances permit 2   Sitting and talking to someone 0   Sitting quietly after a lunch without alcohol 1   In a car, while stopped for a few minutes in traffic 0   [place] Sleepiness Score 8       STOP-BANG Questionnaire    Snore Loudly Yes   Often feel Tired/Fatigued/Sleepy Yes   Observed breathing pauses Yes   Blood pressure problems No   BMI >35 Kg/m2 Body mass index is 42.5 kg/m². Age>50 65 y.o. Neck Circumference>16 inches      Gender Male? male     Total 6       REVIEW OF SYSTEMS:   CONSTITUTIONAL SYMPTOMS: The patient denies fever, fatigue, night sweats, weight loss or weight gain. HEENT: No vision changes. No tinnitus, Denies sinus pain. No hoarseness, or dysphagia. NECK: Patient denies swelling in the neck.    CARDIOVASCULAR: Denies chest pain, palpitation, syncope. RESPIRATORY: Denies shortness of breath or cough. GASTROINTESTINAL: Denies nausea, abdominal pain or change in bowel function. GENITOURINARY: Denies obstructive symptoms. No history of incontinence. BREASTS: No masses or lumps in the breasts. SKIN: No rashes or itching. MUSCULOSKELETAL: Denies weakness or bone pain. NEUROLOGICAL: No headaches or seizures. PSYCHIATRIC: Denies mood swings or depression. ENDOCRINE: Denies heat or cold intolerance or excessive thirst.  HEMATOLOGIC/LYMPHATIC: Denies easy bruising or lymph node swelling. ALLERGIC/IMMUNOLOGIC: No environmental allergies. PAST MEDICAL HISTORY:   Past Medical History:   Diagnosis Date    Enlarged heart     Esophageal stricture     Hx of stress fx     knee    Meniscus tear        PAST SURGICAL HISTORY:   Past Surgical History:   Procedure Laterality Date    COLONOSCOPY      FOOT TENDON SURGERY      KNEE ARTHROSCOPY Right 06/16/16    RIGHT KNEE ARTHROSCOPY WITH PARTIAL MEDIAL MENISCECTOMY AND INTERNAL FIXATION MEDIAL TIBIAL PLATEAU STRESS FRACTURE    KNEE ARTHROSCOPY Left 12/22/2016    : LEFT KNEE ARTHROSCOPY WITH PARTIAL MEDIAL MENISCECTOMY    TONSILLECTOMY      UPPER GASTROINTESTINAL ENDOSCOPY      WISDOM TOOTH EXTRACTION         SOCIAL HISTORY:   Social History     Tobacco Use    Smoking status: Never   Substance Use Topics    Alcohol use:  Yes     Alcohol/week: 0.0 standard drinks     Comment: minimal    Drug use: No       FAMILY HISTORY:   Family History   Problem Relation Age of Onset    Vision Loss Mother     Diabetes Mother     Cancer Father        MEDICATIONS:     Current Outpatient Medications on File Prior to Visit   Medication Sig Dispense Refill    metoprolol tartrate (LOPRESSOR) 50 MG tablet Take 1 tablet by mouth 2 times daily Prior to Coronary CTA 5 tablet 0    ELIQUIS 5 MG TABS tablet       metFORMIN (GLUCOPHAGE-XR) 500 MG extended release tablet Take by mouth 2 times daily      levothyroxine (SYNTHROID) 50 MCG tablet Take by mouth daily      triamterene-hydroCHLOROthiazide (DYAZIDE) 37.5-25 MG per capsule Take 1 capsule by mouth daily as needed      acetaminophen (APAP EXTRA STRENGTH) 500 MG tablet Take 2 tablets by mouth every 6 hours as needed for Pain 120 tablet 3     No current facility-administered medications on file prior to visit. ALLERGIES:   Allergies as of 12/16/2022 - Fully Reviewed 12/16/2022   Allergen Reaction Noted    Adhesive tape Rash 06/15/2016    Merthiolate glycerite [thimerosal] Rash 06/15/2016    Other  06/15/2016    Allergy [loratadine] Rash 06/01/2016      OBJECTIVE:   height is 6' 3\" (1.905 m) and weight is 340 lb (154.2 kg) (abnormal). His blood pressure is 130/72 and his pulse is 85. His oxygen saturation is 99%. PHYSICAL EXAM:    CONSTITUTIONAL: He is a 65y.o.-year-old who appears his stated age. He is alert and oriented x 3 and in no acute distress. HEENT: PERRLA, EOMI. No scleral icterus. No thrush, atraumatic, normocephalic. Mallampati class 2 airway. NECK: Supple, without cervical or supraclavicular lymphadenopathy:  CARDIOVASCULAR: S1 S2 RRR. Without murmer  RESPIRATORY & CHEST: Lungs are clear to auscultation and percussion. No wheezing, no crackles. Good air movement  GASTROINTESTINAL & ABDOMEN: Soft, nontender, positive bowel sounds in all quadrants, non-distended, without hepatosplenomegaly. GENITOURINARY: Deferred. MUSCULOSKELETAL: No tenderness to palpation of the axial skeleton. There is no clubbing. No cyanosis. No edema of the lower extremities. SKIN OF BODY: No rash or jaundice. PSYCHIATRIC EVALUATION: Normal affect. Patient answers questions appropriately. HEMATOLOGIC/LYMPHATIC/ IMMUNOLOGIC: No palpable lymphadenopathy. NEUROLOGIC: Alert and oriented x 3. Groslly non-focal. Motor strength is 5+/5 in all muscle groups. The patient has a normal sensorium globally.       LABS:    Lab Summary Latest Ref Rng & Units 12/22/2016 6/16/2016   WBC 4.0 - 11.0 K/uL 6.4 6.3   Hgb 13.5 - 17.5 g/dL 14.3 15.5   Hct 40.5 - 52.5 % 43.3 46.3   Platelets 722 - 819 K/uL 245 252   Sodium 136 - 145 mmol/L 143 143   Potassium 3.5 - 5.1 mmol/L 3.8 3.8   BUN 7 - 20 mg/dL 15 13   Creatinine 0.9 - 1.3 mg/dL 0.7(L) 0.7(L)   Glucose 70 - 99 mg/dL 132(H) 122(H)   Calcium 8.3 - 10.6 mg/dL 8.9 9.5   Alk Phos 40 - 129 U/L 59 68   Albumin 3.4 - 5.0 g/dL 4.2 4.4   Bilirubin 0.0 - 1.0 mg/dL 0.6 0.5   AST 15 - 37 U/L 24 25   ALT 10 - 40 U/L 33 38   Some recent data might be hidden         IMAGING:    No new chest imaging for me to review. Pulmonary Functions Testing Results:          IMPRESSION AND RECOMMENDATIONS:     1. MEEK (obstructive sleep apnea)  -Patient has several features which are suspicious for obstructive sleep apnea. -I will get a sleep study done in order to investigate this possibility further.  - Sleep Study with PAP Titration; Future    2. New onset atrial fibrillation (HCC)  -Currently rate controlled with metoprolol. Patient is also on Eliquis.  -Uncontrolled sleep apnea can lead to recurrent atrial fibrillation. 3. Class 3 severe obesity with serious comorbidity and body mass index (BMI) of 40.0 to 44.9 in adult, unspecified obesity type ([address]  -I strongly advised the patient to make efforts to lose weight. I discussed various modalities including moderate intensity intermittent exercises, diet control and bariatric surgery. If the patient loses even 10 to 15% of current body weight, it will be beneficial in improving the overall health. Return in about 6 weeks (around 1/27/2023) for meek. Alejo Mcnally MD  Pulmonary Critical Care and Sleep Medicine  12/16/2022, 4:46 PM    This note was completed using dragon medical speech recognition software. Grammatical errors, random word insertions, pronoun errors and incomplete sentences are occasional consequences of this technology due to software limitations.  If there are questions or concerns about the content of this note of information contained within the body of this dictation they should be addressed with the provider for clarification.

## 2022-12-19 ENCOUNTER — PATIENT MESSAGE (OUTPATIENT)
Dept: CARDIOLOGY CLINIC | Age: 62
End: 2022-12-19

## 2022-12-19 NOTE — TELEPHONE ENCOUNTER
Called patient and discussed case. He has AF/RVR. He only took metoprolol prior to CT scan. He has one episodes of ventricular tachycardia vs aberrancy that was brief. Echo and stress unremarkable. Start metoprolol 25 mg bid, he will monitor for SE and can use diltiazem if he does not tolerate. If he has any issue or further questions he should be seen sooner by Dr. Dafne Bowles or EP NP.      YESENIA Love-CNP

## 2023-01-08 ENCOUNTER — HOSPITAL ENCOUNTER (OUTPATIENT)
Dept: SLEEP CENTER | Age: 63
Discharge: HOME | End: 2023-01-08
Payer: COMMERCIAL

## 2023-01-08 DIAGNOSIS — G47.33 OSA (OBSTRUCTIVE SLEEP APNEA): ICD-10-CM

## 2023-01-08 PROCEDURE — 95811 POLYSOM 6/>YRS CPAP 4/> PARM: CPT

## 2023-01-09 ENCOUNTER — TELEPHONE (OUTPATIENT)
Dept: PULMONOLOGY | Age: 63
End: 2023-01-09

## 2023-01-09 PROBLEM — G47.33 OSA (OBSTRUCTIVE SLEEP APNEA): Status: ACTIVE | Noted: 2023-01-09

## 2023-01-19 ENCOUNTER — TELEPHONE (OUTPATIENT)
Dept: CARDIOLOGY CLINIC | Age: 63
End: 2023-01-19

## 2023-01-19 NOTE — TELEPHONE ENCOUNTER
Pt called to inform the office that he is experiencing bloody bowel movement, bloody nose run for 2 day. Pt  is requesting someone call him to discuss what he needs to do.   Please advise

## 2023-01-19 NOTE — TELEPHONE ENCOUNTER
Recommend stopping eliquis until evaluated by his PCP.  He likely needs CBC and occult stool with possible referral to GI is positive occult    Tim Segovia, YESENIA-CNP

## 2023-01-20 NOTE — TELEPHONE ENCOUNTER
Pt states he walked in around 3pm yesterday and was given message and seen a Dr about this issue. Call complete.

## 2023-02-08 PROBLEM — I48.0 PAF (PAROXYSMAL ATRIAL FIBRILLATION) (HCC): Status: ACTIVE | Noted: 2022-11-02

## 2023-02-08 NOTE — PROGRESS NOTES
[address]   Electrophysiology Follow Up    CC: PAF    HPI: Gerda Thomas is a 65 y.o. male with past medical history of DM, hypothyroid. Patient saw PCP for annual exam. New onset atrial fibrillation noted on EKG. . Patient was started on Eliquis . Stress test completed 11/30/2022 was abnormal. Insurance required a Coronary CTA before proceeding with [facility]. Thrombus was noted on CTA and patient underwent a subsequent PREETI which showed no thrombus. Interval History:   [facility] presents to the office in follow up. We discussed his tests in detail. He has complaints of decreased lung capacity, fatigue and joint pain. He states back in November 2022, when he started metoprolol and eliquis, he feels as though he cannot take a full breath. Assessment and plan:   Paroxysmal atrial fibrillation. -ECG today shows Sinus rhythm     -MCOT 11/3/2022 showed 30% AF burden    -New noted at PCP office 11/01/2022 - unsure of duration   -Patient has a [id number] Score of  1 (DM), Continue eliquis 5 mg BID   -Continue lopressor 25 mg BID for rate control, Increasing BB is not an option d/t his episodes of low HR   -TSH 11/1/2022, 4.70  -We discussed treatment options including antiarrhythmics, rate control with anticoagulation, and ablation.   -We discussed progressive nature of atrial fibrillation. Treatment success decreases when AF becomes persistent and last more than 6 months.    -Antiarrhythmic therapy, side effects, benefits and alternative discussed.     -Atrial fibrillation ablation procedure was discussed. We discussed the need for repeat procedure.  On average patients may need more than one ablation procedure.     -Risks associated with ablation include but not limited to allergic reaction to the medications, pain, bleeding, infection, nerve injury, injury to diaphragm(breathing muscle), pulmonary embolus(blood clot in lungs), deep vein blood clot, pneumothorax, hemothorax, acute renal failure, cardiac perforation,  tamponade, need for emergent surgery (open heart), permanent pacemaker, pulmonary vein stenosis, left atrial to esophageal fistula, stroke, myocardial infarction and death. Difference between atrial fibrillation and atrial flutter discussed and treatment discussed. -If he prefers he can work on risk factor modifications prior to scheduling ablation or we can proceed with ablation   -He will proceed with risk factor modifications and consider ablation later   We will see him in 6 months and reevaluate the burden of atrial fibrillation with another monitor and decide about the next step. Given his low heart rate at baseline, increasing the dose of beta-blocker or using antiarrhythmic may require him to have a pacemaker. At the same time he does have RVR with his episodes and due to 30% burden, management of A-fib and maintaining sinus rhythm will be beneficial.  However he may do better after risk factor modification. CAD   -CTA 12/2022 showed CAD RADS category 2 stenosis in the proximal LAD. Otherwise patent coronary arteries. And a calcium score of 61, mild to moderate stenosis   -Abnormal stress test 11/9/2022   -Start    -Refer to general Cardiology    Hypothyroid    -TSH 4.7 11/01/2022 , 4.30 on 12/22/2022   -On synthroid, managed by PCP    Obesity  Body mass index is 42.87 kg/m². -Excessive weight is complicating assessment and treatment. It is placing patient at risk for multiple co-morbidities as well as early death and contributing to the patient's presentation.  -Discussed weight management with diet and exercise     sleep apnea    -Still awaiting his machine, plans to treat this once he receives it   -discussed long term effects of untreated sleep apnea.        Plan:   Refer to general cardiology   Work on risk factor modifications   Follow up June or July       Patient Active Problem List    Diagnosis Date Noted    MEEK (obstructive sleep apnea) 01/09/2023    PAF (paroxysmal atrial fibrillation) ([facility] [address] 11/02/2022    Other specified hypothyroidism 11/02/2022    Obesity 11/02/2022    Suspected sleep apnea 11/02/2022    Medial meniscus tear 07/25/2016     Diagnostic studies:   ECG 2/9/23  SR, QTcH 444,QRS 98    CTA 12/9/2022  IMPRESSION :        Thrombus in the left atrial appendage. Confirmation with echocardiogram is indicated. CAD RADS category 2 stenosis in the proximal LAD. Otherwise patent coronary arteries. Calcium score 61         Echo 11/30/2022  Normal left ventricle size, wall thickness, and systolic function with an estimated ejection fraction of 55-60%. No regional wall motion  abnormalities are seen. Normal diastolic filling pattern for age. The right ventricle is mildly dilated with normal function. No significant valvular abnormalities. Stress Test 11/30/2022  Normal LV size and systolic function. Left ventricular ejection fraction of 66%. There is a small to moderate, mostly reversible inferolateral defect, suggestive of myocardium at risk. Overall, findings represent an intermediate risk scan. Echo 06/27/2014   Summary    Normal left ventricle size and systolic function with an estimated ejection    fraction of 55%. Mild concentric left ventricular hypertrophy is present. Trivial mitral regurgitation is present. The left atrium is dilated. There is mild tricuspid regurgitation with RVSP estimated at 34 mmHg. I independently reviewed the cardiac diagnostic studies, ECG and relevant imaging studies. Lab Results   Component Value Date    LVEF 58 12/15/2022     No results found for: TSHFT4, TSH    Physical Examination:  Vitals:    02/09/23 1503   BP: 122/80   Pulse: 58   SpO2: 97%        Wt Readings from Last 3 Encounters:   02/09/23 (!) 343 lb (155.6 kg)   12/16/22 (!) 340 lb (154.2 kg)   12/15/22 (!) 340 lb (154.2 kg)       Constitutional: Oriented. No distress. Head: Normocephalic and atraumatic.    Mouth/Throat: Oropharynx is clear and moist.   Eyes: Conjunctivae normal. EOM are normal.   Neck: Neck supple. No rigidity. No JVD present. Cardiovascular: Normal rate, regular rhythm, S1&S2. Pulmonary/Chest: Bilateral respiratory sounds. No wheezes, No rhonchi. Abdominal: Soft. Bowel sounds present. No distension, No tenderness. Musculoskeletal: No tenderness. No edema    Lymphadenopathy: Has no cervical adenopathy. Neurological: Alert and oriented. Cranial nerve appears intact, No Gross deficit   Skin: Skin is warm and dry. No rash noted. Psychiatric: Has a normal behavior       Review of System:  [x] Full ROS obtained and negative except as mentioned in HPI    Prior to Admission medications    Medication Sig Start Date End Date Taking?  Authorizing Provider   albuterol sulfate HFA (PROVENTIL;VENTOLIN;PROAIR) 108 (90 Base) MCG/ACT inhaler Inhale 2 puffs into the lungs every 6 hours as needed 12/22/22 12/22/23 Yes Historical Provider, MD   vitamin D (CHOLECALCIFEROL) 91408 UNIT CAPS Take 1 capsule by mouth once WEEKLY for 3 months, then continue OTC Vit D3 2000IU once daily 12/22/22  Yes Historical Provider, MD   metoprolol tartrate (LOPRESSOR) 25 MG tablet Take 1 tablet by mouth 2 times daily 12/19/22  Yes Margarita Favorite, APRN - CNP   ELIQUIS 5 MG TABS tablet  11/1/22  Yes Historical Provider, MD   metFORMIN (GLUCOPHAGE-XR) 500 MG extended release tablet Take by mouth 2 times daily 9/8/22  Yes Historical Provider, MD   levothyroxine (SYNTHROID) 50 MCG tablet Take 75 mcg by mouth daily 9/8/22 9/8/23 Yes Historical Provider, MD   triamterene-hydroCHLOROthiazide (Antonia Pin) 37.5-25 MG per capsule Take 1 capsule by mouth daily as needed 9/8/22  Yes Historical Provider, MD   acetaminophen (APAP EXTRA STRENGTH) 500 MG tablet Take 2 tablets by mouth every 6 hours as needed for Pain 6/16/16  Yes Quentin Leo MD       Past Medical History:   Diagnosis Date    Enlarged heart     Esophageal stricture     Hx of stress fx     knee    Meniscus tear Past Surgical History:   Procedure Laterality Date    COLONOSCOPY      FOOT TENDON SURGERY      KNEE ARTHROSCOPY Right 06/16/16    RIGHT KNEE ARTHROSCOPY WITH PARTIAL MEDIAL MENISCECTOMY AND INTERNAL FIXATION MEDIAL TIBIAL PLATEAU STRESS FRACTURE    KNEE ARTHROSCOPY Left 12/22/2016    : LEFT KNEE ARTHROSCOPY WITH PARTIAL MEDIAL MENISCECTOMY    TONSILLECTOMY      UPPER GASTROINTESTINAL ENDOSCOPY      WISDOM TOOTH EXTRACTION         Allergies   Allergen Reactions    Adhesive Tape Rash    Merthiolate Glycerite [Thimerosal] Rash    Other      Most allergy medications cause rash    Allergy [Loratadine] Rash       Social History:  Reviewed. reports that he has never smoked. He does not have any smokeless tobacco history on file. He reports current alcohol use. He reports that he does not use drugs. Family History:  Reviewed. Reviewed. No family history of SCD. Relevant and available labs, and cardiovascular diagnostics reviewed. Reviewed. I independently reviewed all cardiac tracing. I independently reviewed relevant and available cardiac diagnostic tests ECG, CXR, Echo, Stress test, Device interrogation, Holter, CT scan. Outside medical records via Care everywhere reviewed and summarized in H&P above. Complex medical condition with multiple medical problems affecting prognosis and outcome of EP interventions      - The patient is counseled to follow a low salt diet to assure blood pressure remains controlled for cardiovascular risk factor modification.   - The patient is counseled to avoid excess caffeine, and energy drinks as this may exacerbated ectopy and arrhythmia. - The patient is counseled to get regular exercise 3-5 times per week to control cardiovascular risk factors. - The patient is counseled to lose weigt to control cardiovascular risk factors. All questions and concerns were addressed to the patient/family. Alternatives to my treatment were discussed.  I have discussed the above stated plan and the patient verbalized understanding and agreed with the plan. Scribe attestation: This note was scribed in the presence of Lul Juan MD by Amanda Blackman RN    I, Dr. Lul Juan personally performed the services described in this documentation as scribed by RN in my presence, and it is both accurate and complete. NOTE: This report was transcribed using voice recognition software. Every effort was made to ensure accuracy, however, inadvertent computerized transcription errors may be present.      Lul Juan MD, [facility], [address] [place] [address]   Office: [phone]  Fax: [phone]

## 2023-02-09 ENCOUNTER — TELEPHONE (OUTPATIENT)
Dept: CARDIOLOGY CLINIC | Age: 63
End: 2023-02-09

## 2023-02-09 ENCOUNTER — OFFICE VISIT (OUTPATIENT)
Dept: CARDIOLOGY CLINIC | Age: 63
End: 2023-02-09
Payer: COMMERCIAL

## 2023-02-09 VITALS
HEART RATE: 58 BPM | DIASTOLIC BLOOD PRESSURE: 80 MMHG | OXYGEN SATURATION: 97 % | HEIGHT: 75 IN | BODY MASS INDEX: 39.17 KG/M2 | SYSTOLIC BLOOD PRESSURE: 122 MMHG | WEIGHT: 315 LBS

## 2023-02-09 DIAGNOSIS — G47.33 OSA (OBSTRUCTIVE SLEEP APNEA): ICD-10-CM

## 2023-02-09 DIAGNOSIS — E03.8 OTHER SPECIFIED HYPOTHYROIDISM: ICD-10-CM

## 2023-02-09 DIAGNOSIS — E66.01 CLASS 3 SEVERE OBESITY WITH SERIOUS COMORBIDITY AND BODY MASS INDEX (BMI) OF 40.0 TO 44.9 IN ADULT, UNSPECIFIED OBESITY TYPE (HCC): ICD-10-CM

## 2023-02-09 DIAGNOSIS — I48.0 PAF (PAROXYSMAL ATRIAL FIBRILLATION) (HCC): Primary | ICD-10-CM

## 2023-02-09 PROCEDURE — 93000 ELECTROCARDIOGRAM COMPLETE: CPT | Performed by: INTERNAL MEDICINE

## 2023-02-09 PROCEDURE — 99214 OFFICE O/P EST MOD 30 MIN: CPT | Performed by: INTERNAL MEDICINE

## 2023-02-09 RX ORDER — ALBUTEROL SULFATE 90 UG/1
2 AEROSOL, METERED RESPIRATORY (INHALATION) EVERY 6 HOURS PRN
COMMUNITY
Start: 2022-12-22 | End: 2023-12-22

## 2023-02-09 NOTE — TELEPHONE ENCOUNTER
Pt called to request [facility] instead of [facility] for new patient schedule

## 2023-02-09 NOTE — TELEPHONE ENCOUNTER
New Patient Referral    Needs dr Daquan Rice     Referring Provider Name: dr Rj Alonso   Phone Byron Peralta  Fax Number:  Address: [address]     Diagnosis/Reason for Visit: needs reg card    Cardiac Clearance? no    Cardiac Testing: EKG, echo     Date testing was completed? 2/9, 12/15    Have records been requested? No     Preferred  [address] needed?  No

## 2023-02-23 ENCOUNTER — TELEPHONE (OUTPATIENT)
Dept: PULMONOLOGY | Age: 63
End: 2023-02-23

## 2023-02-23 NOTE — TELEPHONE ENCOUNTER
[facility] called and said they had spoke to pt and he wanted to make adjustments to his pressure. She said he wants his ramp time shorter, higher ramp pressure.     Ph # [phone]    [facility] if we have any questions, to call Wagner Long

## 2023-02-23 NOTE — TELEPHONE ENCOUNTER
Dr. Sachi Lopez, please advise. He has only used his machine for 2 days. Compliance report attached.

## 2023-02-24 NOTE — TELEPHONE ENCOUNTER
Spoke with patient. Instructed to unplug machine for 30 min then plug back in and the changes should come across the modem.

## 2023-02-26 PROBLEM — I25.83 CORONARY ARTERY DISEASE DUE TO LIPID RICH PLAQUE: Status: ACTIVE | Noted: 2023-02-26

## 2023-02-26 PROBLEM — E78.2 MIXED HYPERLIPIDEMIA: Status: ACTIVE | Noted: 2023-02-26

## 2023-02-26 PROBLEM — I10 HYPERTENSION: Status: ACTIVE | Noted: 2023-02-26

## 2023-02-26 PROBLEM — I25.10 CORONARY ARTERY DISEASE DUE TO LIPID RICH PLAQUE: Status: ACTIVE | Noted: 2023-02-26

## 2023-02-27 NOTE — PROGRESS NOTES
[address]    2023    Shanel Lewis (:  1960) is a 65 y.o. male presenting as a new patient for evaluation of CAD. Recent abnormal stress test, Coronary CTA. Referring Provider: Cristóbal Cochran MD    HISTORY: Mr. Torri Garcia was recently seen by our EP service with Dr Arnie Rahman for new onset A-fib. Abnormal stress testing completed in , insurance required coronary CTA prior to cardiac cath. CTA revealed thrombus in the left atrial appendage. Echo and PREETI completed with no evidence of Thrombus. Today he reports no chest discomfort. His biggest complaint is that he feels his metoprolol is affecting him. He reports sometimes his HR is high, but when it is low, it will go \"very low\". He has c/o dizziness at times, slow moving up the stairs, he feels it may be the medication. Does not feel palpitations in office today while in Afib with HR low 100s, he has felt them in the past. Some swelling in legs noted on exam.     REVIEW OF SYSTEMS:  A complete review of systems was reviewed and is negative except as noted in the history of present illness. Prior to Visit Medications    Medication Sig Taking?  Authorizing Provider   aspirin EC 81 MG EC tablet Take 1 tablet by mouth daily Yes Kiera Lucio MD   rosuvastatin (CRESTOR) 5 MG tablet Take 1 tablet by mouth daily Yes Kiera Lucio MD   albuterol sulfate HFA (PROVENTIL;VENTOLIN;PROAIR) 108 (90 Base) MCG/ACT inhaler Inhale 2 puffs into the lungs every 6 hours as needed Yes Historical Provider, MD   vitamin D (CHOLECALCIFEROL) 10977 UNIT CAPS Take 1 capsule by mouth once WEEKLY for 3 months, then continue OTC Vit D3 2000IU once daily Yes Historical Provider, MD   metoprolol tartrate (LOPRESSOR) 25 MG tablet Take 1 tablet by mouth 2 times daily Yes YESENIA Verde - CNP   ELIQUIS 5 MG TABS tablet  Yes Historical Provider, MD   metFORMIN (GLUCOPHAGE-XR) 500 MG extended release tablet Take by mouth 2 times daily Yes Historical Provider, MD   levothyroxine (SYNTHROID) 50 MCG tablet Take 75 mcg by mouth daily Yes Historical Provider, MD   triamterene-hydroCHLOROthiazide (DYAZIDE) 37.5-25 MG per capsule Take 1 capsule by mouth daily as needed Yes Historical Provider, MD   acetaminophen (APAP EXTRA STRENGTH) 500 MG tablet Take 2 tablets by mouth every 6 hours as needed for Pain Yes Luis Turner MD        Allergies   Allergen Reactions    Adhesive Tape Rash    Merthiolate Glycerite [Thimerosal] Rash    Other      Most allergy medications cause rash    Allergy [Loratadine] Rash       Past Medical History:   Diagnosis Date    Enlarged heart     Esophageal stricture     Hx of stress fx     knee    Meniscus tear        Past Surgical History:   Procedure Laterality Date    COLONOSCOPY      FOOT TENDON SURGERY      KNEE ARTHROSCOPY Right 06/16/16    RIGHT KNEE ARTHROSCOPY WITH PARTIAL MEDIAL MENISCECTOMY AND INTERNAL FIXATION MEDIAL TIBIAL PLATEAU STRESS FRACTURE    KNEE ARTHROSCOPY Left 12/22/2016    : LEFT KNEE ARTHROSCOPY WITH PARTIAL MEDIAL MENISCECTOMY    TONSILLECTOMY      UPPER GASTROINTESTINAL ENDOSCOPY      WISDOM TOOTH EXTRACTION         Social History     Tobacco Use    Smoking status: Never    Smokeless tobacco: Not on file   Substance Use Topics    Alcohol use: Yes     Alcohol/week: 0.0 standard drinks     Comment: minimal        Family History   Problem Relation Age of Onset    Vision Loss Mother     Diabetes Mother     Cancer Father        PHYSICAL EXAMINATION:  Vitals:    02/28/23 1311   BP: 110/70   Site: Left Upper Arm   Position: Sitting   Cuff Size: Large Adult   Pulse: 99   SpO2: 99%   Weight: (!) 339 lb (153.8 kg)   Height: 6' 4\" (1.93 m)     Estimated body mass index is 41.26 kg/m² as calculated from the following:    Height as of this encounter: 6' 4\" (1.93 m). Weight as of this encounter: 339 lb (153.8 kg). Physical Exam  HENT:      Head: Normocephalic and atraumatic.    Eyes:      Extraocular Movements: Extraocular movements intact. Conjunctiva/sclera: Conjunctivae normal.   Cardiovascular:      Rate and Rhythm: Normal rate and regular rhythm. Pulmonary:      Effort: Pulmonary effort is normal.      Breath sounds: Normal breath sounds. Abdominal:      General: Bowel sounds are normal.      Palpations: Abdomen is soft. Musculoskeletal:         General: Normal range of motion. Cervical back: Normal range of motion and neck supple. Right lower leg: Edema present. Left lower leg: Edema present. Comments: 1+   Skin:     General: Skin is warm and dry. Neurological:      General: No focal deficit present. Mental Status: He is alert and oriented to person, place, and time. Psychiatric:         Mood and Affect: Mood normal.         Behavior: Behavior normal.         Thought Content:  Thought content normal.         Judgment: Judgment normal.       LABS:  CBC:   Lab Results   Component Value Date/Time    WBC 6.4 12/22/2016 06:44 AM    RBC 4.90 12/22/2016 06:44 AM    HGB 14.3 12/22/2016 06:44 AM    HCT 43.3 12/22/2016 06:44 AM    MCV 88.4 12/22/2016 06:44 AM    RDW 13.3 12/22/2016 06:44 AM     12/22/2016 06:44 AM     CMP:   Lab Results   Component Value Date/Time     12/22/2016 06:44 AM    K 3.8 12/22/2016 06:44 AM     12/22/2016 06:44 AM    CO2 26 12/22/2016 06:44 AM    BUN 15 12/22/2016 06:44 AM    CREATININE 0.7 12/09/2022 12:25 PM    CREATININE 0.7 12/22/2016 06:44 AM    GFRAA >60 12/22/2016 06:44 AM    AGRATIO 2.0 12/22/2016 06:44 AM    LABGLOM >60 12/09/2022 12:25 PM    GLUCOSE 132 12/22/2016 06:44 AM    PROT 6.3 12/22/2016 06:44 AM    CALCIUM 8.9 12/22/2016 06:44 AM    BILITOT 0.6 12/22/2016 06:44 AM    ALKPHOS 59 12/22/2016 06:44 AM    AST 24 12/22/2016 06:44 AM    ALT 33 12/22/2016 06:44 AM     LIPIDS: No components found for: TOTAL CHOLESTEROL,  HDL,  LDL,  TRIGLYCERIDES  PT/INR: No results found for: PTINR    PREETI 12/15/22  Summary  Normal left ventricle size, wall thickness, and systolic function with an estimated ejection fraction of 55-60%. There is no evidence of mass or thrombus in the left atrium or appendage. Aneurysmal intra atrial septum. Echo 11/22  Summary  Normal left ventricle size, wall thickness, and systolic function with an estimated ejection fraction of 55-60%. No regional wall motion abnormalities are seen. Normal diastolic filling pattern for age. The right ventricle is mildly dilated with normal function. No significant valvular abnormalities. Coronary CTA 12/2/22  Thrombus in the left atrial appendage. Confirmation with echocardiogram is indicated. CAD RADS category 2 stenosis in the proximal LAD. Otherwise patent coronary arteries. Calcium score 61  Findings discussed with the medical assistant in the office of Dr. Nasima Torres on 12/9/2022 at 1440. Stress 11/22  Summary Normal LV size and systolic function. Left ventricular ejection fraction of 66%. There is a small to moderate, mostly reversible inferolateral defect,  suggestive of myocardium at risk. Overall, findings represent an intermediate risk scan. Recommendation  Recommend cardiac catheterization. ASSESSMENT/PLAN:  1. Coronary artery disease   PREETI~ 12/22 no evidence of thrombus  Coronary CTA - 12/22 Thrombus of left atrial appendage, mild plaque   Echo~ 11/22 EF 50-55%  Stress~ 11/22 small-moderte reversible defect  Current meds~ BB    Plan: start 81 mg EC Asa. Statin therapy. 2. PAF (paroxysmal atrial fibrillation) ([address]  - new onset 11/22   - MCOT confirmed Afib  - Ekg today is afib. - Eliquis started 11/22  - discussion with Dr Danny Yu of ablation    Plan: established with EP. 3. Hypertension, unspecified type  /70 (Site: Left Upper Arm, Position: Sitting, Cuff Size: Large Adult)   Pulse 99   Ht 6' 4\" (1.93 m)   Wt (!) 339 lb (153.8 kg)   SpO2 99%   BMI 41.26 kg/m²    - metoprolol     Plan: Stable. Continue current medication regimen. 4. Mixed hyperlipidemia  12/22   HDL 33 LDL 86.  - not currently treated     Plan: start  Crestor 5 mg  to get LDL below 70. Repeat fasting Lipid / LFTs in 2 months     5. MEEK  - has been wearing CPAP for about a week now. Plan: continue     6. Swelling in legs bilat  - notes swelling at different times of the day. - on dyazide for swelling    Plan: monitor when swelling is better or worse. 7. DM  - on metformin  - last A1C 7.8    Plan:   Start 81 mg ec Asa  Start Crestor 10 mg and repeat fasting labs in 2 months  Monitor when swelling is worse in legs   Continue with f/u with EP    No follow-ups on file. Scribe's Attestation: This note was scribed in the presence of Dr. Brandy Bansal MD by Beau Dorman RN. Physician Attestation: The scribe's documentation has been prepared under my direction and personally reviewed by me in its entirety. I confirm that the note above accurately reflects all work, treatment, procedures, and medical decision making performed by me. An  electronic signature was used to authenticate this note. Antonieta Agee MD, [facility] [place], [address]

## 2023-02-27 NOTE — PATIENT INSTRUCTIONS
Monitor the swelling in the morning. Venous insufficiency will show swelling ok in the morning and worsens throughout the day. Compression stockings, diuretic and elevating legs will help. Start 81 mg EC aspirin daily  Start Crestor 5 mg for your cholesterol, goal is LDL <70. Repeat your fasting lipid panel in 2 months to recheck cholesterol.

## 2023-02-28 ENCOUNTER — OFFICE VISIT (OUTPATIENT)
Dept: CARDIOLOGY CLINIC | Age: 63
End: 2023-02-28
Payer: COMMERCIAL

## 2023-02-28 VITALS
OXYGEN SATURATION: 99 % | HEIGHT: 76 IN | WEIGHT: 315 LBS | BODY MASS INDEX: 38.36 KG/M2 | DIASTOLIC BLOOD PRESSURE: 70 MMHG | HEART RATE: 99 BPM | SYSTOLIC BLOOD PRESSURE: 110 MMHG

## 2023-02-28 DIAGNOSIS — I25.10 CORONARY ARTERY DISEASE DUE TO LIPID RICH PLAQUE: Primary | ICD-10-CM

## 2023-02-28 DIAGNOSIS — E78.2 MIXED HYPERLIPIDEMIA: ICD-10-CM

## 2023-02-28 DIAGNOSIS — I48.0 PAF (PAROXYSMAL ATRIAL FIBRILLATION) (HCC): ICD-10-CM

## 2023-02-28 DIAGNOSIS — I10 HYPERTENSION, UNSPECIFIED TYPE: ICD-10-CM

## 2023-02-28 DIAGNOSIS — I25.83 CORONARY ARTERY DISEASE DUE TO LIPID RICH PLAQUE: Primary | ICD-10-CM

## 2023-02-28 PROCEDURE — 3078F DIAST BP <80 MM HG: CPT | Performed by: INTERNAL MEDICINE

## 2023-02-28 PROCEDURE — 99204 OFFICE O/P NEW MOD 45 MIN: CPT | Performed by: INTERNAL MEDICINE

## 2023-02-28 PROCEDURE — 3074F SYST BP LT 130 MM HG: CPT | Performed by: INTERNAL MEDICINE

## 2023-02-28 PROCEDURE — 93000 ELECTROCARDIOGRAM COMPLETE: CPT | Performed by: INTERNAL MEDICINE

## 2023-02-28 RX ORDER — ASPIRIN 81 MG/1
81 TABLET ORAL DAILY
Qty: 90 TABLET | Refills: 3 | Status: SHIPPED | OUTPATIENT
Start: 2023-02-28

## 2023-02-28 RX ORDER — ASPIRIN 81 MG/1
81 TABLET ORAL DAILY
Qty: 90 TABLET | Refills: 3 | Status: SHIPPED | OUTPATIENT
Start: 2023-02-28 | End: 2023-02-28

## 2023-02-28 RX ORDER — ROSUVASTATIN CALCIUM 5 MG/1
5 TABLET, COATED ORAL DAILY
Qty: 90 TABLET | Refills: 3 | Status: SHIPPED | OUTPATIENT
Start: 2023-02-28 | End: 2023-02-28

## 2023-02-28 RX ORDER — ROSUVASTATIN CALCIUM 5 MG/1
5 TABLET, COATED ORAL DAILY
Qty: 90 TABLET | Refills: 3 | Status: SHIPPED | OUTPATIENT
Start: 2023-02-28

## 2023-07-13 ENCOUNTER — OFFICE VISIT (OUTPATIENT)
Dept: CARDIOLOGY CLINIC | Age: 63
End: 2023-07-13
Payer: COMMERCIAL

## 2023-07-13 VITALS
DIASTOLIC BLOOD PRESSURE: 84 MMHG | BODY MASS INDEX: 38.36 KG/M2 | HEIGHT: 76 IN | OXYGEN SATURATION: 97 % | HEART RATE: 89 BPM | SYSTOLIC BLOOD PRESSURE: 122 MMHG | WEIGHT: 315 LBS

## 2023-07-13 DIAGNOSIS — I25.83 CORONARY ARTERY DISEASE DUE TO LIPID RICH PLAQUE: ICD-10-CM

## 2023-07-13 DIAGNOSIS — I25.10 CORONARY ARTERY DISEASE DUE TO LIPID RICH PLAQUE: ICD-10-CM

## 2023-07-13 DIAGNOSIS — G47.33 OSA (OBSTRUCTIVE SLEEP APNEA): ICD-10-CM

## 2023-07-13 DIAGNOSIS — E03.8 OTHER SPECIFIED HYPOTHYROIDISM: ICD-10-CM

## 2023-07-13 DIAGNOSIS — I48.0 PAF (PAROXYSMAL ATRIAL FIBRILLATION) (HCC): Primary | ICD-10-CM

## 2023-07-13 PROCEDURE — 3079F DIAST BP 80-89 MM HG: CPT | Performed by: INTERNAL MEDICINE

## 2023-07-13 PROCEDURE — 3074F SYST BP LT 130 MM HG: CPT | Performed by: INTERNAL MEDICINE

## 2023-07-13 PROCEDURE — 99214 OFFICE O/P EST MOD 30 MIN: CPT | Performed by: INTERNAL MEDICINE

## 2023-07-13 PROCEDURE — 93000 ELECTROCARDIOGRAM COMPLETE: CPT | Performed by: INTERNAL MEDICINE

## 2023-07-13 RX ORDER — CHLORAL HYDRATE 500 MG
1 CAPSULE ORAL NIGHTLY
COMMUNITY
Start: 2023-06-08

## 2023-07-13 NOTE — PROGRESS NOTES
14 Day E-Patch requested for pt. Device was registered and placed. Tutorial given, pt verbalized understanding.  Date 07/13/2023  Time 1100Am  S/N [id number]  tj
PARTIAL MEDIAL MENISCECTOMY    TONSILLECTOMY      UPPER GASTROINTESTINAL ENDOSCOPY      WISDOM TOOTH EXTRACTION         Allergies   Allergen Reactions    Adhesive Tape Rash    Merthiolate Glycerite [Thimerosal] Rash    Other      Most allergy medications cause rash    Allergy [Loratadine] Rash       Social History:  Reviewed. reports that he has never smoked. He does not have any smokeless tobacco history on file. He reports current alcohol use. He reports that he does not use drugs. Family History:  Reviewed. Reviewed. No family history of SCD. Relevant and available labs, and cardiovascular diagnostics reviewed. Reviewed. I independently reviewed all cardiac tracing. I independently reviewed relevant and available cardiac diagnostic tests ECG, CXR, Echo, Stress test, Device interrogation, Holter, CT scan. Outside medical records via Care everywhere reviewed and summarized in H&P above. Complex medical condition with multiple medical problems affecting prognosis and outcome of EP interventions      - The patient is counseled to follow a low salt diet to assure blood pressure remains controlled for cardiovascular risk factor modification.   - The patient is counseled to avoid excess caffeine, and energy drinks as this may exacerbated ectopy and arrhythmia. - The patient is counseled to get regular exercise 3-5 times per week to control cardiovascular risk factors. - The patient is counseled to lose weigt to control cardiovascular risk factors. All questions and concerns were addressed to the patient/family. Alternatives to my treatment were discussed. I have discussed the above stated plan and the patient verbalized understanding and agreed with the plan. Scribe attestation:  This note was scribed in the presence of Kristina Marsh MD by Steffen Carrion RN    I, Dr. Kristina Marsh personally performed the services described in this documentation as scribed by RN in my presence,

## 2023-09-21 RX ORDER — ROSUVASTATIN CALCIUM 5 MG/1
5 TABLET, COATED ORAL DAILY
Qty: 90 TABLET | Refills: 3 | Status: SHIPPED | OUTPATIENT
Start: 2023-09-21

## 2023-09-21 NOTE — TELEPHONE ENCOUNTER
Last OV: 02/28/2023  Dyan Hardy  Last Labs:lipid-02/28/2023  Dyan Hardy  Next OV:10/05/2023  Chester Ceballos  Last Refill: Rosuvastatin-02/28/2023 Dyan Haryd

## 2023-09-25 ENCOUNTER — PATIENT MESSAGE (OUTPATIENT)
Dept: CARDIOLOGY CLINIC | Age: 63
End: 2023-09-25

## 2023-09-25 NOTE — TELEPHONE ENCOUNTER
From: Criselda Arciniega  To: Dr. Bernadine Walsh  Sent: 9/25/2023 3:32 PM EDT  Subject: Labs Question    Good afternoon! I am about to have some bloodwork labs done Wednesday morning at [facility]. I also have an upcoming appointment with you in a couple weeks. Are there any specific requests for a lab that I be sure to get so you have the most recent results?   Nancy [place]  [phone] (cell)

## 2023-09-26 DIAGNOSIS — E78.2 MIXED HYPERLIPIDEMIA: Primary | ICD-10-CM

## 2023-09-28 RX ORDER — APIXABAN 5 MG/1
5 TABLET, FILM COATED ORAL 2 TIMES DAILY
Qty: 60 TABLET | Refills: 3 | Status: SHIPPED | OUTPATIENT
Start: 2023-09-28

## 2023-10-05 ENCOUNTER — OFFICE VISIT (OUTPATIENT)
Dept: CARDIOLOGY CLINIC | Age: 63
End: 2023-10-05
Payer: COMMERCIAL

## 2023-10-05 VITALS
BODY MASS INDEX: 39.17 KG/M2 | SYSTOLIC BLOOD PRESSURE: 122 MMHG | WEIGHT: 315 LBS | HEART RATE: 127 BPM | OXYGEN SATURATION: 98 % | DIASTOLIC BLOOD PRESSURE: 82 MMHG | HEIGHT: 75 IN

## 2023-10-05 DIAGNOSIS — E66.01 CLASS 3 SEVERE OBESITY WITH SERIOUS COMORBIDITY AND BODY MASS INDEX (BMI) OF 40.0 TO 44.9 IN ADULT, UNSPECIFIED OBESITY TYPE (HCC): ICD-10-CM

## 2023-10-05 DIAGNOSIS — I48.19 PERSISTENT ATRIAL FIBRILLATION (HCC): Primary | ICD-10-CM

## 2023-10-05 DIAGNOSIS — I25.83 CORONARY ARTERY DISEASE DUE TO LIPID RICH PLAQUE: ICD-10-CM

## 2023-10-05 DIAGNOSIS — I25.10 CORONARY ARTERY DISEASE DUE TO LIPID RICH PLAQUE: ICD-10-CM

## 2023-10-05 DIAGNOSIS — G47.33 OSA (OBSTRUCTIVE SLEEP APNEA): ICD-10-CM

## 2023-10-05 DIAGNOSIS — E03.8 OTHER SPECIFIED HYPOTHYROIDISM: ICD-10-CM

## 2023-10-05 DIAGNOSIS — I10 HYPERTENSION, UNSPECIFIED TYPE: ICD-10-CM

## 2023-10-05 PROCEDURE — 3074F SYST BP LT 130 MM HG: CPT | Performed by: INTERNAL MEDICINE

## 2023-10-05 PROCEDURE — 3079F DIAST BP 80-89 MM HG: CPT | Performed by: INTERNAL MEDICINE

## 2023-10-05 PROCEDURE — 99214 OFFICE O/P EST MOD 30 MIN: CPT | Performed by: INTERNAL MEDICINE

## 2023-10-05 PROCEDURE — 93000 ELECTROCARDIOGRAM COMPLETE: CPT | Performed by: INTERNAL MEDICINE

## 2023-10-05 RX ORDER — METOPROLOL TARTRATE 50 MG/1
50 TABLET, FILM COATED ORAL 2 TIMES DAILY
Qty: 180 TABLET | Refills: 3 | Status: SHIPPED | OUTPATIENT
Start: 2023-10-05

## 2023-10-05 RX ORDER — APIXABAN 5 MG/1
5 TABLET, FILM COATED ORAL 2 TIMES DAILY
Qty: 180 TABLET | Refills: 3 | Status: SHIPPED | OUTPATIENT
Start: 2023-10-05

## 2023-10-05 NOTE — PROGRESS NOTES
[facility]   Electrophysiology Follow Up    CC: PAF   HPI: Rehan Cam is a 59 y.o. male with past medical history of DM, hypothyroid. Patient saw PCP for annual exam. New onset atrial fibrillation noted on EKG. . Patient was started on Eliquis . Stress test completed 11/30/2022 was abnormal. Insurance required a Coronary CTA before proceeding with [facility]. Thrombus was noted on CTA and patient underwent a subsequent PREETI which showed no thrombus. Interval History:    [facility] presents to the office in follow up. He is feeling well today. Denies any cardiac symptoms. Patient denies lightheadedness, dizziness, chest pain, palpitations, orthopnea, edema, presyncope or syncope. Only complaint is nerve type pain in his shoulder. Assessment and plan:   Persistent atrial fibrillation. -ECG today shows Atrial fibrillation   -MCOT 11/3/2022 showed 30% AF burden    -New noted at PCP office 11/01/2022 - unsure of duration   -Patient has a [id number] Score of  2 (DM, HTN ),Continue eliquis 5 mg BID   -Increase lopressor to 50 mg BID for rate control, If he does not tolerate we will start diltiazem 240 mg daily    -TSH  3.67 06/05/2023    - 2 week holter 7/13/2023, 100% Afib controlled rate. 4 NSVT  -We discussed treatment options including DCCV and ablation.   -We discussed progressive nature of atrial fibrillation. Treatment success decreases when AF becomes persistent and last more than 6 months.    -Atrial fibrillation ablation procedure was discussed. We discussed the need for repeat procedure.  On average patients may need more than one ablation procedure.     -Risks associated with ablation include but not limited to allergic reaction to the medications, pain, bleeding, infection, nerve injury, injury to diaphragm(breathing muscle), pulmonary embolus(blood clot in lungs), deep vein blood clot, pneumothorax, hemothorax, acute renal failure, cardiac perforation,  tamponade, need for emergent surgery

## 2023-10-10 ENCOUNTER — OFFICE VISIT (OUTPATIENT)
Dept: PULMONOLOGY | Age: 63
End: 2023-10-10
Payer: COMMERCIAL

## 2023-10-10 VITALS
DIASTOLIC BLOOD PRESSURE: 78 MMHG | BODY MASS INDEX: 39.17 KG/M2 | HEIGHT: 75 IN | WEIGHT: 315 LBS | SYSTOLIC BLOOD PRESSURE: 120 MMHG | HEART RATE: 107 BPM | OXYGEN SATURATION: 99 %

## 2023-10-10 DIAGNOSIS — E66.01 CLASS 3 SEVERE OBESITY WITH SERIOUS COMORBIDITY AND BODY MASS INDEX (BMI) OF 40.0 TO 44.9 IN ADULT, UNSPECIFIED OBESITY TYPE (HCC): ICD-10-CM

## 2023-10-10 DIAGNOSIS — I10 HYPERTENSION, UNSPECIFIED TYPE: ICD-10-CM

## 2023-10-10 DIAGNOSIS — I48.91 NEW ONSET ATRIAL FIBRILLATION (HCC): ICD-10-CM

## 2023-10-10 DIAGNOSIS — G47.33 OSA (OBSTRUCTIVE SLEEP APNEA): Primary | ICD-10-CM

## 2023-10-10 PROCEDURE — 3078F DIAST BP <80 MM HG: CPT | Performed by: INTERNAL MEDICINE

## 2023-10-10 PROCEDURE — 3074F SYST BP LT 130 MM HG: CPT | Performed by: INTERNAL MEDICINE

## 2023-10-10 PROCEDURE — 99214 OFFICE O/P EST MOD 30 MIN: CPT | Performed by: INTERNAL MEDICINE

## 2023-10-10 ASSESSMENT — SLEEP AND FATIGUE QUESTIONNAIRES
HOW LIKELY ARE YOU TO NOD OFF OR FALL ASLEEP IN A CAR, WHILE STOPPED FOR A FEW MINUTES IN TRAFFIC: 0
HOW LIKELY ARE YOU TO NOD OFF OR FALL ASLEEP WHILE SITTING AND TALKING TO SOMEONE: 0
HOW LIKELY ARE YOU TO NOD OFF OR FALL ASLEEP WHILE LYING DOWN TO REST IN THE AFTERNOON WHEN CIRCUMSTANCES PERMIT: 3
HOW LIKELY ARE YOU TO NOD OFF OR FALL ASLEEP WHEN YOU ARE A PASSENGER IN A CAR FOR AN HOUR WITHOUT A BREAK: 1
HOW LIKELY ARE YOU TO NOD OFF OR FALL ASLEEP WHILE WATCHING TV: 1
HOW LIKELY ARE YOU TO NOD OFF OR FALL ASLEEP WHILE SITTING INACTIVE IN A PUBLIC PLACE: 1
HOW LIKELY ARE YOU TO NOD OFF OR FALL ASLEEP WHILE SITTING QUIETLY AFTER LUNCH WITHOUT ALCOHOL: 1
ESS TOTAL SCORE: 8
HOW LIKELY ARE YOU TO NOD OFF OR FALL ASLEEP WHILE SITTING AND READING: 1

## 2023-11-01 NOTE — TELEPHONE ENCOUNTER
Last OV:02/28/2023  Emile Gregg  Last Labs:lipid- 09/26/2023  Leena  Next OV: None  Last Refill: Rosuvastatin-09/21/2023  Emile Gregg

## 2023-11-06 RX ORDER — ROSUVASTATIN CALCIUM 5 MG/1
5 TABLET, COATED ORAL DAILY
Qty: 90 TABLET | Refills: 3 | Status: SHIPPED | OUTPATIENT
Start: 2023-11-06

## 2023-12-05 ENCOUNTER — TELEPHONE (OUTPATIENT)
Dept: CARDIOLOGY CLINIC | Age: 63
End: 2023-12-05

## 2024-01-04 NOTE — PROGRESS NOTES
K 3.8 2016     2016    CO2 26 2016     CrCl cannot be calculated (Patient's most recent lab result is older than the maximum 180 days allowed.).     Thyroid: No results found for: \"TSH\", \"D0PDKYF\", \"C0UCHUY\", \"THYROIDAB\"  Lipid Panel: No results found for: \"CHOL\", \"HDL\", \"TRIG\"  LFTs:  Lab Results   Component Value Date    ALT 33 2016    AST 24 2016    ALKPHOS 59 2016    BILITOT 0.6 2016     Coags: No results found for: \"PROTIME\", \"INR\", \"APTT\"    EC2024 (Personally Interpreted)  - Atrial fibrillation. Rate 84, QRS 94, QTc 424    PREETI:    Normal left ventricle size, wall thickness, and systolic function with an estimated ejection fraction of 55-60%. There is no evidence of mass or thrombus in the left atrium or appendage. Aneurysmal intra atrial septum.    Echo:    Normal left ventricle size, wall thickness, and systolic function with an   estimated ejection fraction of 55-60%. No regional wall motion abnormalities   are seen. Normal diastolic filling pattern for age. The right ventricle is mildly dilated with normal function. No significant valvular abnormalities.    GXT:    Normal LV size and systolic function. Left ventricular ejection fraction of 66%.   There is a small to moderate, mostly reversible inferolateral defect, suggestive of myocardium at risk. Overall, findings represent an intermediate risk scan.    Cardiac CT:   Thrombus in the left atrial appendage. Confirmation with echocardiogram is indicated.  CAD RADS category 2 stenosis in the proximal LAD. Otherwise patent coronary arteries.  Calcium score 61    Assessment:    1.Persistent Atrial Fibrillation  - Currently in AF  - Continue metoprolol 50 mg BID  - [id number] score:high; [id number] Vasc score and anticoagulation discussed. High risk for stroke and thromboembolism. Anticoagulation is recommended. Risk of bleeding was discussed.  ~ Continue Eliquis 5 mg BID; tolerating

## 2024-02-02 ENCOUNTER — OFFICE VISIT (OUTPATIENT)
Dept: CARDIOLOGY CLINIC | Age: 64
End: 2024-02-02
Payer: COMMERCIAL

## 2024-02-02 VITALS
SYSTOLIC BLOOD PRESSURE: 110 MMHG | HEIGHT: 76 IN | DIASTOLIC BLOOD PRESSURE: 70 MMHG | HEART RATE: 88 BPM | OXYGEN SATURATION: 98 % | BODY MASS INDEX: 38.36 KG/M2 | WEIGHT: 315 LBS

## 2024-02-02 DIAGNOSIS — G47.33 OSA (OBSTRUCTIVE SLEEP APNEA): ICD-10-CM

## 2024-02-02 DIAGNOSIS — I48.19 PERSISTENT ATRIAL FIBRILLATION (HCC): Primary | ICD-10-CM

## 2024-02-02 DIAGNOSIS — E78.2 MIXED HYPERLIPIDEMIA: ICD-10-CM

## 2024-02-02 DIAGNOSIS — I10 HYPERTENSION, UNSPECIFIED TYPE: ICD-10-CM

## 2024-02-02 PROCEDURE — 93000 ELECTROCARDIOGRAM COMPLETE: CPT | Performed by: NURSE PRACTITIONER

## 2024-02-02 PROCEDURE — 99214 OFFICE O/P EST MOD 30 MIN: CPT | Performed by: NURSE PRACTITIONER

## 2024-02-02 PROCEDURE — 3074F SYST BP LT 130 MM HG: CPT | Performed by: NURSE PRACTITIONER

## 2024-02-02 PROCEDURE — 3078F DIAST BP <80 MM HG: CPT | Performed by: NURSE PRACTITIONER

## 2024-02-22 NOTE — PROGRESS NOTES
estimated ejection fraction of 55-60%.  There is no evidence of mass or thrombus in the left atrium or appendage.   Aneurysmal intra atrial septum.    Echo 11/22  Summary  Normal left ventricle size, wall thickness, and systolic function with an estimated ejection fraction of 55-60%. No regional wall motion abnormalities are seen.  Normal diastolic filling pattern for age.  The right ventricle is mildly dilated with normal function.  No significant valvular abnormalities.    Coronary CTA 12/2/22  Thrombus in the left atrial appendage. Confirmation with echocardiogram is indicated.  CAD RADS category 2 stenosis in the proximal LAD. Otherwise patent coronary arteries.  Calcium score 61  Findings discussed with the medical assistant in the office of Dr. Rich on 12/9/2022 at 1440.     Stress 11/22  Summary Normal LV size and systolic function.  Left ventricular ejection fraction of 66%.  There is a small to moderate, mostly reversible inferolateral defect,  suggestive of myocardium at risk.  Overall, findings represent an intermediate risk scan.   Recommendation  Recommend cardiac catheterization.     ASSESSMENT/PLAN:  1. Coronary artery disease   PREETI~ 12/22 no evidence of thrombus  Coronary CTA - 12/22 Thrombus of left atrial appendage, mild plaque   Echo~ 11/22 EF 50-55%  Stress~ 11/22 small-moderte reversible defect  Current meds~ BB, Asa, statin    Plan: Stable. Continue current medication regimen.      2. PAF (paroxysmal atrial fibrillation) (H[facility])  - new onset 11/22   - MCOT confirmed Afib  - Ekg today is afib.   - Eliquis started 11/22  - discussion with Dr Murcia of ablation    Plan: established with EP.     3. Hypertension, unspecified type  /67 (Site: Right Upper Arm, Position: Sitting, Cuff Size: Large Adult)   Pulse 68   Ht 1.93 m (6' 4\")   Wt (!) 156.9 kg (346 lb)   SpO2 98%   BMI 42.12 kg/m²    - metoprolol     Plan: Stable. Continue current medication regimen.       4. Mixed

## 2024-02-23 ENCOUNTER — OFFICE VISIT (OUTPATIENT)
Dept: CARDIOLOGY CLINIC | Age: 64
End: 2024-02-23

## 2024-02-23 VITALS
DIASTOLIC BLOOD PRESSURE: 67 MMHG | HEART RATE: 68 BPM | HEIGHT: 76 IN | WEIGHT: 315 LBS | SYSTOLIC BLOOD PRESSURE: 120 MMHG | BODY MASS INDEX: 38.36 KG/M2 | OXYGEN SATURATION: 98 %

## 2024-02-23 DIAGNOSIS — R06.02 SOB (SHORTNESS OF BREATH): ICD-10-CM

## 2024-02-23 DIAGNOSIS — E78.2 MIXED HYPERLIPIDEMIA: ICD-10-CM

## 2024-02-23 DIAGNOSIS — R29.818 SUSPECTED SLEEP APNEA: ICD-10-CM

## 2024-02-23 DIAGNOSIS — I10 HYPERTENSION, UNSPECIFIED TYPE: ICD-10-CM

## 2024-02-23 DIAGNOSIS — I48.19 PERSISTENT ATRIAL FIBRILLATION (HCC): ICD-10-CM

## 2024-02-23 DIAGNOSIS — I25.83 CORONARY ARTERY DISEASE DUE TO LIPID RICH PLAQUE: Primary | ICD-10-CM

## 2024-02-23 DIAGNOSIS — I25.10 CORONARY ARTERY DISEASE DUE TO LIPID RICH PLAQUE: Primary | ICD-10-CM

## 2024-02-23 NOTE — PATIENT INSTRUCTIONS
Schedule an echocardiogram  Have blood work done to check the fluid levels in case that is a cause of your shortness of breath

## 2024-03-20 ENCOUNTER — HOSPITAL ENCOUNTER (OUTPATIENT)
Age: 64
Discharge: HOME | End: 2024-03-20
Payer: COMMERCIAL

## 2024-03-20 ENCOUNTER — HOSPITAL ENCOUNTER (OUTPATIENT)
Dept: NON INVASIVE DIAGNOSTICS | Age: 64
Discharge: HOME | End: 2024-03-20
Payer: COMMERCIAL

## 2024-03-20 DIAGNOSIS — I25.83 CORONARY ARTERY DISEASE DUE TO LIPID RICH PLAQUE: ICD-10-CM

## 2024-03-20 DIAGNOSIS — R06.02 SOB (SHORTNESS OF BREATH): ICD-10-CM

## 2024-03-20 DIAGNOSIS — I25.10 CORONARY ARTERY DISEASE DUE TO LIPID RICH PLAQUE: ICD-10-CM

## 2024-03-20 LAB — NT-PROBNP SERPL-MCNC: 294 PG/ML (ref 0–124)

## 2024-03-20 PROCEDURE — 36415 COLL VENOUS BLD VENIPUNCTURE: CPT

## 2024-03-20 PROCEDURE — 6360000004 HC RX CONTRAST MEDICATION: Performed by: INTERNAL MEDICINE

## 2024-03-20 PROCEDURE — 83880 ASSAY OF NATRIURETIC PEPTIDE: CPT

## 2024-03-20 PROCEDURE — C8929 TTE W OR WO FOL WCON,DOPPLER: HCPCS

## 2024-03-20 RX ADMIN — PERFLUTREN 1.5 ML: 6.52 INJECTION, SUSPENSION INTRAVENOUS at 16:19

## 2024-03-21 ENCOUNTER — TELEPHONE (OUTPATIENT)
Dept: CARDIOLOGY CLINIC | Age: 64
End: 2024-03-21

## 2024-03-21 DIAGNOSIS — R60.9 SWELLING: Primary | ICD-10-CM

## 2024-03-21 NOTE — TELEPHONE ENCOUNTER
I spoke with pt and relayed Echo results per [facility]. Pt verbalized understanding. He also wanted to know the results of his BNP. I advised that [facility] hasn't viewed yet. Pt stated that when he got his Echo that he thought he could see his afib, and wanted to know if that is possible?

## 2024-03-21 NOTE — TELEPHONE ENCOUNTER
----- Message from Gris Wagner RN sent at 3/20/2024  7:12 PM EDT -----  Per [facility], Echo looks good.  Strength of the heart muscle is normal.  No significant valvular abnormalities.

## 2024-03-21 NOTE — TELEPHONE ENCOUNTER
It is sometimes possible to see that the heart rhythm is not normal while having an echo. It would not be something that can diagnose Afib, but you can see if it is abnormal.   Bnp was 294, can be suggestive of fluid, but not always. Dr Stern wants pt to repeat the blood work in 2 weeks. If it continues to go up, we might consider a low dose diuretic. For now elevate legs when you can, watch sodium intake.

## 2024-04-04 ENCOUNTER — HOSPITAL ENCOUNTER (OUTPATIENT)
Age: 64
Discharge: HOME | End: 2024-04-04
Payer: COMMERCIAL

## 2024-04-04 DIAGNOSIS — E78.2 MIXED HYPERLIPIDEMIA: ICD-10-CM

## 2024-04-04 DIAGNOSIS — R60.9 SWELLING: ICD-10-CM

## 2024-04-04 LAB
ALBUMIN SERPL-MCNC: 4.3 G/DL (ref 3.4–5)
ALP SERPL-CCNC: 63 U/L (ref 40–129)
ALT SERPL-CCNC: 36 U/L (ref 10–40)
AST SERPL-CCNC: 29 U/L (ref 15–37)
BILIRUB DIRECT SERPL-MCNC: <0.2 MG/DL (ref 0–0.3)
BILIRUB INDIRECT SERPL-MCNC: NORMAL MG/DL (ref 0–1)
BILIRUB SERPL-MCNC: 0.7 MG/DL (ref 0–1)
NT-PROBNP SERPL-MCNC: 372 PG/ML (ref 0–124)
PROT SERPL-MCNC: 6.8 G/DL (ref 6.4–8.2)

## 2024-04-04 PROCEDURE — 83880 ASSAY OF NATRIURETIC PEPTIDE: CPT

## 2024-04-04 PROCEDURE — 80076 HEPATIC FUNCTION PANEL: CPT

## 2024-04-04 PROCEDURE — 36415 COLL VENOUS BLD VENIPUNCTURE: CPT

## 2024-04-09 ENCOUNTER — TELEPHONE (OUTPATIENT)
Dept: CARDIOLOGY CLINIC | Age: 64
End: 2024-04-09

## 2024-04-09 DIAGNOSIS — I25.83 CORONARY ARTERY DISEASE DUE TO LIPID RICH PLAQUE: Primary | ICD-10-CM

## 2024-04-09 DIAGNOSIS — R60.9 SWELLING: ICD-10-CM

## 2024-04-09 DIAGNOSIS — I25.10 CORONARY ARTERY DISEASE DUE TO LIPID RICH PLAQUE: Primary | ICD-10-CM

## 2024-04-09 RX ORDER — FUROSEMIDE 20 MG/1
20 TABLET ORAL DAILY
Qty: 90 TABLET | Refills: 1 | Status: SHIPPED | OUTPATIENT
Start: 2024-04-09 | End: 2024-04-09 | Stop reason: SDUPTHER

## 2024-04-09 NOTE — TELEPHONE ENCOUNTER
----- Message from Gris Wagner RN sent at 4/9/2024  4:12 PM EDT -----  Spoke with Dr Stern regarding labs. He would like for pt to stop his Maxzide and replace it with Lasix 20 mg daily. Repeat labs in 2 weeks to check magnesium level, kidney function as well as Bnp. Please let patient know. I will send Rx to his pharmacy and place orders for labs.

## 2024-04-09 NOTE — TELEPHONE ENCOUNTER
I spoke with pt and relayed lab results and instructions per [facility]. Pt verbalized understanding. Sript was send new script.

## 2024-04-10 RX ORDER — FUROSEMIDE 20 MG/1
20 TABLET ORAL DAILY
Qty: 90 TABLET | Refills: 1 | Status: SHIPPED | OUTPATIENT
Start: 2024-04-10

## 2024-04-15 ENCOUNTER — OFFICE VISIT (OUTPATIENT)
Dept: PULMONOLOGY | Age: 64
End: 2024-04-15
Payer: COMMERCIAL

## 2024-04-15 VITALS
WEIGHT: 315 LBS | BODY MASS INDEX: 38.36 KG/M2 | SYSTOLIC BLOOD PRESSURE: 114 MMHG | OXYGEN SATURATION: 98 % | DIASTOLIC BLOOD PRESSURE: 76 MMHG | HEIGHT: 76 IN | HEART RATE: 85 BPM

## 2024-04-15 DIAGNOSIS — J30.1 NON-SEASONAL ALLERGIC RHINITIS DUE TO POLLEN: ICD-10-CM

## 2024-04-15 DIAGNOSIS — I48.91 NEW ONSET ATRIAL FIBRILLATION (HCC): ICD-10-CM

## 2024-04-15 DIAGNOSIS — I10 HYPERTENSION, UNSPECIFIED TYPE: ICD-10-CM

## 2024-04-15 DIAGNOSIS — G47.33 OSA (OBSTRUCTIVE SLEEP APNEA): Primary | ICD-10-CM

## 2024-04-15 DIAGNOSIS — E66.01 CLASS 3 SEVERE OBESITY WITH SERIOUS COMORBIDITY AND BODY MASS INDEX (BMI) OF 40.0 TO 44.9 IN ADULT, UNSPECIFIED OBESITY TYPE (HCC): ICD-10-CM

## 2024-04-15 PROCEDURE — 3078F DIAST BP <80 MM HG: CPT | Performed by: INTERNAL MEDICINE

## 2024-04-15 PROCEDURE — 3074F SYST BP LT 130 MM HG: CPT | Performed by: INTERNAL MEDICINE

## 2024-04-15 PROCEDURE — 99214 OFFICE O/P EST MOD 30 MIN: CPT | Performed by: INTERNAL MEDICINE

## 2024-04-15 RX ORDER — MONTELUKAST SODIUM 10 MG/1
10 TABLET ORAL DAILY
Qty: 30 TABLET | Refills: 3 | Status: SHIPPED | OUTPATIENT
Start: 2024-04-15

## 2024-04-15 RX ORDER — AZELASTINE HYDROCHLORIDE, FLUTICASONE PROPIONATE 137; 50 UG/1; UG/1
1 SPRAY, METERED NASAL 2 TIMES DAILY
Qty: 23 G | Refills: 2 | Status: SHIPPED | OUTPATIENT
Start: 2024-04-15

## 2024-04-15 ASSESSMENT — SLEEP AND FATIGUE QUESTIONNAIRES
ESS TOTAL SCORE: 5
HOW LIKELY ARE YOU TO NOD OFF OR FALL ASLEEP WHEN YOU ARE A PASSENGER IN A CAR FOR AN HOUR WITHOUT A BREAK: SLIGHT CHANCE OF DOZING
HOW LIKELY ARE YOU TO NOD OFF OR FALL ASLEEP IN A CAR, WHILE STOPPED FOR A FEW MINUTES IN TRAFFIC: WOULD NEVER DOZE
HOW LIKELY ARE YOU TO NOD OFF OR FALL ASLEEP WHILE SITTING QUIETLY AFTER LUNCH WITHOUT ALCOHOL: SLIGHT CHANCE OF DOZING
HOW LIKELY ARE YOU TO NOD OFF OR FALL ASLEEP WHILE SITTING INACTIVE IN A PUBLIC PLACE: WOULD NEVER DOZE
HOW LIKELY ARE YOU TO NOD OFF OR FALL ASLEEP WHILE SITTING AND READING: SLIGHT CHANCE OF DOZING
HOW LIKELY ARE YOU TO NOD OFF OR FALL ASLEEP WHILE SITTING AND TALKING TO SOMEONE: WOULD NEVER DOZE
HOW LIKELY ARE YOU TO NOD OFF OR FALL ASLEEP WHILE LYING DOWN TO REST IN THE AFTERNOON WHEN CIRCUMSTANCES PERMIT: MODERATE CHANCE OF DOZING
HOW LIKELY ARE YOU TO NOD OFF OR FALL ASLEEP WHILE WATCHING TV: WOULD NEVER DOZE

## 2024-04-15 NOTE — PROGRESS NOTES
PULMONARY OFFICE FOLLOW-UP VISIT    CONSULTING PHYSICIAN:  Jose Mcwilliams MD     REASON FOR VISIT:   Chief Complaint   Patient presents with    Sleep Apnea       DATE OF VISIT: 4/15/2024    HISTORY OF PRESENT ILLNESS: 64 y.o. year old male comes in to the pulmonary office for a follow-up.  Patient has been using his CPAP therapy regularly.  Feels benefited from it.  Denies any pressure intolerance.  Has not been replacing his mask interface regularly.  Also does not clean his machine regularly.  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  Also has been reporting intermittent cough for the last 4 to 5 months.  Cough is associated with postnasal drip, sore throat and occasional nasal and sinus congestion.  Mostly dry but intermittently productive of whitish expectoration.  Very rarely expectoration has turned green.  Currently feeling better.  Recently has adopted 2 cats after which his allergies and cough have worsened.    Previously: Continues to use his auto CPAP therapy regularly and feels benefited from it.  Occasionally he comes late from his job and puts the CPAP machine late in the nighttime.  Then he has to wake up early which makes his total usage hours less.  Does still have minor mask leakage.  As per the patient it has significantly reduced after replacing the mask.  Weight remains stable.  Patient was diagnosed to have mild to moderate obstructive sleep apnea and started on auto CPAP therapy.  He has been using this therapy regularly.  Many nights he finds his mask moving on his face.  Mask also leaks.  He has not yet replaced the mask interface.  Denies any pressure intolerance.  Weight remains stable.  Does feel that the air coming through the CPAP is cold. Patient was recently diagnosed to have paroxysmal atrial fibrillation.  He reported to the cardiologist that he has poor quality sleep with snoring, gasping, choking at nighttime.  Sleep remains

## 2024-04-16 ENCOUNTER — TELEPHONE (OUTPATIENT)
Dept: ADMINISTRATIVE | Age: 64
End: 2024-04-16

## 2024-04-16 NOTE — TELEPHONE ENCOUNTER
Submitted PA for Azelastine-Fluticasone [facility] Key: [id number] STATUS: APPROVED 3/17/2024-4/16/2025.    If this requires a response please respond to the pool ( P [facility] MEDICATION PRE-AUTH).      Thank you please advise patient.

## 2024-05-06 RX ORDER — APIXABAN 5 MG/1
5 TABLET, FILM COATED ORAL 2 TIMES DAILY
Qty: 180 TABLET | Refills: 3 | Status: SHIPPED | OUTPATIENT
Start: 2024-05-06

## 2024-05-06 NOTE — TELEPHONE ENCOUNTER
Last ov:24 KJC  Next ov:24 MXA  Last EK24  Last labs:24  Last filled:   Disp Refills Start End    ELIQUIS 5 MG TABS tablet 180 tablet 3 10/5/2023 --    Sig - Route: Take 1 tablet by mouth 2 times daily - Oral    Sent to pharmacy as: Eliquis 5 MG Oral Tablet    Cosign for Ordering: Accepted by Wes Murcia MD on 10/5/2023  2:18 PM    E-Prescribing Status: Receipt confirmed by pharmacy (10/5/2023  1:59 PM EDT)

## 2024-06-03 RX ORDER — MONTELUKAST SODIUM 10 MG/1
10 TABLET ORAL DAILY
Qty: 30 TABLET | Refills: 5 | Status: SHIPPED | OUTPATIENT
Start: 2024-06-03

## 2024-06-06 ENCOUNTER — HOSPITAL ENCOUNTER (OUTPATIENT)
Age: 64
Discharge: HOME | End: 2024-06-06
Payer: COMMERCIAL

## 2024-06-06 DIAGNOSIS — R60.9 SWELLING: ICD-10-CM

## 2024-06-06 LAB
ALBUMIN SERPL-MCNC: 4.3 G/DL (ref 3.4–5)
ALBUMIN/GLOB SERPL: 1.4 {RATIO} (ref 1.1–2.2)
ALP SERPL-CCNC: 55 U/L (ref 40–129)
ALT SERPL-CCNC: 34 U/L (ref 10–40)
ANION GAP SERPL CALCULATED.3IONS-SCNC: 12 MMOL/L (ref 3–16)
AST SERPL-CCNC: 30 U/L (ref 15–37)
BILIRUB SERPL-MCNC: 0.7 MG/DL (ref 0–1)
BUN SERPL-MCNC: 14 MG/DL (ref 7–20)
CALCIUM SERPL-MCNC: 9.5 MG/DL (ref 8.3–10.6)
CHLORIDE SERPL-SCNC: 98 MMOL/L (ref 99–110)
CO2 SERPL-SCNC: 28 MMOL/L (ref 21–32)
CREAT SERPL-MCNC: 0.8 MG/DL (ref 0.8–1.3)
GFR SERPLBLD CREATININE-BSD FMLA CKD-EPI: >90 ML/MIN/{1.73_M2}
GLUCOSE SERPL-MCNC: 357 MG/DL (ref 70–99)
MAGNESIUM SERPL-MCNC: 1.8 MG/DL (ref 1.8–2.4)
NT-PROBNP SERPL-MCNC: 470 PG/ML (ref 0–124)
POTASSIUM SERPL-SCNC: 3.9 MMOL/L (ref 3.5–5.1)
PROT SERPL-MCNC: 7.3 G/DL (ref 6.4–8.2)
SODIUM SERPL-SCNC: 138 MMOL/L (ref 136–145)

## 2024-06-06 PROCEDURE — 80053 COMPREHEN METABOLIC PANEL: CPT

## 2024-06-06 PROCEDURE — 83880 ASSAY OF NATRIURETIC PEPTIDE: CPT

## 2024-06-06 PROCEDURE — 36415 COLL VENOUS BLD VENIPUNCTURE: CPT

## 2024-06-06 PROCEDURE — 83735 ASSAY OF MAGNESIUM: CPT

## 2024-06-14 ENCOUNTER — PATIENT MESSAGE (OUTPATIENT)
Dept: CARDIOLOGY CLINIC | Age: 64
End: 2024-06-14

## 2024-06-14 NOTE — TELEPHONE ENCOUNTER
From: Faheem Gomez  To: Dr. Jose Stern  Sent: 6/14/2024 10:55 AM EDT  Subject: Liver Function Question    Greetings!  Here was your last response.  \"Your recent labs show an elevated glucose, maybe check with your pcp to follow up on that.  Your bnp still around 470 which sometimes indicated fluid build up. Are you feeling ok? Having swelling or weight gain? If not, then we can sit tight, if you are swelling etc, we may need to increase your diuretic.\"    I just want to follow up on the liver function testing, since you were concerned that the lasix might affect that. What's the test say in relation to that?  I don't feel horrible or great. I am having problems with constipation and bloating. My weight goes up and down. My feet sometimes swell, but that usually depends on what I have been doing (like sitting at a desk for hours, etc.)

## 2024-06-17 DIAGNOSIS — I25.83 CORONARY ARTERY DISEASE DUE TO LIPID RICH PLAQUE: Primary | ICD-10-CM

## 2024-06-17 DIAGNOSIS — I25.10 CORONARY ARTERY DISEASE DUE TO LIPID RICH PLAQUE: Primary | ICD-10-CM

## 2024-06-20 NOTE — TELEPHONE ENCOUNTER
This was sent to patient via [employer], he has not yet read it. Please call to give him the following message:     Your liver function is normal, usually we look at kidney function with lasix which is also normal.   I know it is difficult, but elevate your legs when you can to help with swelling. Dr Stern said that we can try to go up to 40 mg of your furosemide to see if that helps the swelling/weight gain.   Things to watch for :   Weight gain of 2 lbs in one day  3 lbs over 2 days  5 lbs in a week     This is typically fluid. Try the 40 mg of furosemide and recheck your blood work again in 2 weeks. I will put the orders in and [facility] they'll be there.  Thanks!    Gris and Dr Stern

## 2024-06-21 NOTE — TELEPHONE ENCOUNTER
As long as he is feeling well and not holding fluid, no, he does not need to increase the dose or do blood work.

## 2024-06-21 NOTE — TELEPHONE ENCOUNTER
I spoke to pt, relayed the message below. He v/u and stated he dont have edema or any weight gain currently. Weight goes up and down but currently he is losing weight. He lose 14 pounds in a month. Pt asking if he still needs to try 40 mg of furosemide and recheck blood work again in 2 weeks?

## 2024-07-05 RX ORDER — METOPROLOL TARTRATE 50 MG/1
50 TABLET, FILM COATED ORAL 2 TIMES DAILY
Qty: 180 TABLET | Refills: 3 | Status: SHIPPED | OUTPATIENT
Start: 2024-07-05

## 2024-07-05 RX ORDER — MONTELUKAST SODIUM 10 MG/1
10 TABLET ORAL DAILY
Qty: 30 TABLET | Refills: 5 | Status: SHIPPED | OUTPATIENT
Start: 2024-07-05

## 2024-07-05 NOTE — TELEPHONE ENCOUNTER
Pharmacy change.    Last ov:24 KJC  Next ov:24 MXA  Last EK24  Last labs:24  Last filled:   Disp Refills Start End    metoprolol tartrate (LOPRESSOR) 50 MG tablet 180 tablet 3 10/5/2023 --    Sig - Route: Take 1 tablet by mouth 2 times daily - Oral    Sent to pharmacy as: Metoprolol Tartrate 50 MG Oral Tablet (LOPRESSOR)    Cosign for Ordering: Accepted by Wes Murcia MD on 10/5/2023  2:18 PM    E-Prescribing Status: Receipt confirmed by pharmacy (10/5/2023  2:04 PM EDT)

## 2024-07-05 NOTE — TELEPHONE ENCOUNTER
Requested Prescriptions     Pending Prescriptions Disp Refills    rosuvastatin (CRESTOR) 5 MG tablet 90 tablet 3     Sig: Take 1 tablet by mouth daily    furosemide (LASIX) 20 MG tablet 90 tablet 1     Sig: Take 1 tablet by mouth daily      [facility]      Last OV:  2/23/2024 KJC    Next OV: 7/5/2024 MXA    Last Labs: 04/05/2024 LIPID, BMP 06/06/2024     Last Filled: 11/06/2023, 04/10/2024 [facility]

## 2024-07-08 RX ORDER — FUROSEMIDE 20 MG/1
20 TABLET ORAL DAILY
Qty: 90 TABLET | Refills: 1 | Status: SHIPPED | OUTPATIENT
Start: 2024-07-08

## 2024-07-08 RX ORDER — ROSUVASTATIN CALCIUM 5 MG/1
5 TABLET, COATED ORAL DAILY
Qty: 90 TABLET | Refills: 3 | Status: SHIPPED | OUTPATIENT
Start: 2024-07-08

## 2024-07-29 NOTE — PROGRESS NOTES
[facility]   Electrophysiology Follow Up    CC: AFIB  HPI: Faheem Gomez is a 64 y.o. male with past medical history of DM, hypothyroid.  Patient saw PCP for annual exam. New onset atrial fibrillation noted on EKG.. Patient was started on Eliquis .     Stress test completed 11/30/2022 was abnormal. Insurance required a Coronary CTA before proceeding with [facility]. Thrombus was noted on CTA and patient underwent a subsequent PREETI which showed no thrombus.    Interval History:    Faheem presents for follow up for his AFIB. States that he is having no symptoms of AFIB. States he is having more bruising from his eliquis. Says he  is still considering the ablation.       Assessment and plan:   Persistent atrial fibrillation.    -ECG today shows A fib   -MCOT 11/3/2022 showed 30% AF burden    -New noted at PCP office 11/01/2022 - unsure of duration   -Patient has a [id number]-WWSn Score of  2 (DM, HTN ),Continue eliquis 5 mg BID   -Continue lopressor to 50 mg BID for rate control   -TSH  3.67 06/05/2023    - 2 week holter 7/13/2023, 100% Afib controlled rate.4 NSVT  --We discussed treatment options including antiarrhythmics, rate control with anticoagulation, and ablation.   -We discussed progressive nature of atrial fibrillation. Treatment success decreases when AF becomes persistent and last more than 6 months.    -Antiarrhythmic therapy, side effects, benefits and alternative discussed.     -Atrial fibrillation ablation procedure was discussed.  We discussed the need for repeat procedure. On average patients may need more than one ablation procedure.     -Risks associated with ablation include but not limited to allergic reaction to the medications, pain, bleeding, infection, nerve injury, injury to diaphragm(breathing muscle), pulmonary embolus(blood clot in lungs), deep vein blood clot, pneumothorax, hemothorax, acute renal failure, cardiac perforation,  tamponade, need for emergent surgery (open heart), permanent

## 2024-07-31 ENCOUNTER — OFFICE VISIT (OUTPATIENT)
Dept: CARDIOLOGY CLINIC | Age: 64
End: 2024-07-31
Payer: COMMERCIAL

## 2024-07-31 VITALS
HEIGHT: 76 IN | OXYGEN SATURATION: 98 % | WEIGHT: 241 LBS | HEART RATE: 82 BPM | SYSTOLIC BLOOD PRESSURE: 114 MMHG | DIASTOLIC BLOOD PRESSURE: 84 MMHG | BODY MASS INDEX: 29.35 KG/M2

## 2024-07-31 DIAGNOSIS — I10 PRIMARY HYPERTENSION: ICD-10-CM

## 2024-07-31 DIAGNOSIS — G47.33 OSA (OBSTRUCTIVE SLEEP APNEA): ICD-10-CM

## 2024-07-31 DIAGNOSIS — E66.01 CLASS 3 SEVERE OBESITY WITH SERIOUS COMORBIDITY AND BODY MASS INDEX (BMI) OF 40.0 TO 44.9 IN ADULT, UNSPECIFIED OBESITY TYPE (HCC): ICD-10-CM

## 2024-07-31 DIAGNOSIS — E03.8 OTHER SPECIFIED HYPOTHYROIDISM: ICD-10-CM

## 2024-07-31 DIAGNOSIS — I25.83 CORONARY ARTERY DISEASE DUE TO LIPID RICH PLAQUE: ICD-10-CM

## 2024-07-31 DIAGNOSIS — I25.10 CORONARY ARTERY DISEASE DUE TO LIPID RICH PLAQUE: ICD-10-CM

## 2024-07-31 DIAGNOSIS — I48.19 PERSISTENT ATRIAL FIBRILLATION (HCC): Primary | ICD-10-CM

## 2024-07-31 PROCEDURE — 3074F SYST BP LT 130 MM HG: CPT | Performed by: INTERNAL MEDICINE

## 2024-07-31 PROCEDURE — 99214 OFFICE O/P EST MOD 30 MIN: CPT | Performed by: INTERNAL MEDICINE

## 2024-07-31 PROCEDURE — 93000 ELECTROCARDIOGRAM COMPLETE: CPT | Performed by: INTERNAL MEDICINE

## 2024-07-31 PROCEDURE — 3079F DIAST BP 80-89 MM HG: CPT | Performed by: INTERNAL MEDICINE

## 2024-07-31 RX ORDER — MONTELUKAST SODIUM 10 MG/1
10 TABLET ORAL DAILY
Qty: 30 TABLET | Refills: 5 | Status: SHIPPED | OUTPATIENT
Start: 2024-07-31

## 2024-07-31 NOTE — PATIENT INSTRUCTIONS
ATRIAL FIBRILLATION ABLATION INFORMATION    Our  will be contacting you with a date and time for your procedure    The morning of your ablation you will need to check in at the registration desk in the main lobby.     PRE-PROCEDURE INSTRUCTIONS -   -Do not eat or drink anything after midnight the night before your ablation.  -You will need to have a responsible adult to drive you home.  -Your nurse will provide you with discharge instructions.  -You will need to hold your eliquis the night before and morning of  -If you take medications such as Ozempic, Mounjaro, zepbound or Wegovy, we recommend you not take the dose the week of your procedure.  -If you are taking a diuretic (water pill) please hold the morning of the procedure  -If you take long acting insulin, take 1/2 the dose the evening before or morning of depending on when you take your dosage.  -You may take all of your other medications with a sip of water.    You will be scheduled for a 6-8 week follow up with cardiology.  This will be done prior to your discharge instructions.    If you have any questions regarding the procedure itself or medications, please call [phone] and ask to speak to an EP nurse.

## 2024-08-01 ENCOUNTER — TELEPHONE (OUTPATIENT)
Dept: CARDIOLOGY CLINIC | Age: 64
End: 2024-08-01

## 2024-08-01 NOTE — TELEPHONE ENCOUNTER
Spoke with the pt and he is aware no dated currently. I will touch base when I have something.     ATRIAL FIBRILLATION ABLATION INFORMATION  Date:  Arrival time:  Procedure time:     Our  will be contacting you with a date and time for your procedure     The morning of your ablation you will need to check in at the registration desk in the main lobby.                 PRE-PROCEDURE INSTRUCTIONS -   -Do not eat or drink anything after midnight the night before your ablation.  -You will need to have a responsible adult to drive you home.  -Your nurse will provide you with discharge instructions.  -You will need to hold your eliquis the night before and morning of  -If you take medications such as Ozempic, Mounjaro, zepbound or Wegovy, we recommend you not take the dose the week of your procedure.  -If you are taking a diuretic (water pill) please hold the morning of the procedure  -If you take long acting insulin, take 1/2 the dose the evening before or morning of depending on when you take your dosage.  -You may take all of your other medications with a sip of water.     You will be scheduled for a 6-8 week follow up with cardiology.  This will be done prior to your discharge instructions.     If you have any questions regarding the procedure itself or medications, please call [phone] and ask to speak to an EP nurse.

## 2024-08-15 RX ORDER — APIXABAN 5 MG/1
5 TABLET, FILM COATED ORAL 2 TIMES DAILY
Qty: 180 TABLET | Refills: 3 | Status: CANCELLED | OUTPATIENT
Start: 2024-08-15

## 2024-10-14 ENCOUNTER — TELEPHONE (OUTPATIENT)
Dept: CARDIOLOGY CLINIC | Age: 64
End: 2024-10-14

## 2024-10-14 NOTE — TELEPHONE ENCOUNTER
LVM for pt to return call about scheduling procedure. I have a few remaining procedures for 2024.    ATRIAL FIBRILLATION ABLATION INFORMATION  Date:  Arrival time:  Procedure time:     Our  will be contacting you with a date and time for your procedure     The morning of your ablation you will need to check in at the registration desk in the main lobby.                 PRE-PROCEDURE INSTRUCTIONS -   -Do not eat or drink anything after midnight the night before your ablation.  -You will need to have a responsible adult to drive you home.  -Your nurse will provide you with discharge instructions.  -You will need to hold your eliquis the night before and morning of  -If you take medications such as Ozempic, Mounjaro, zepbound or Wegovy, we recommend you not take the dose the week of your procedure.  -If you are taking a diuretic (water pill) please hold the morning of the procedure  -If you take long acting insulin, take 1/2 the dose the evening before or morning of depending on when you take your dosage.  -You may take all of your other medications with a sip of water.     You will be scheduled for a 6-8 week follow up with cardiology.  This will be done prior to your discharge instructions.     If you have any questions regarding the procedure itself or medications, please call [phone] and ask to speak to an EP nurse.

## 2024-10-18 ASSESSMENT — SLEEP AND FATIGUE QUESTIONNAIRES
HOW LIKELY ARE YOU TO NOD OFF OR FALL ASLEEP WHILE LYING DOWN TO REST IN THE AFTERNOON WHEN CIRCUMSTANCES PERMIT: MODERATE CHANCE OF DOZING
HOW LIKELY ARE YOU TO NOD OFF OR FALL ASLEEP WHILE SITTING AND READING: SLIGHT CHANCE OF DOZING
HOW LIKELY ARE YOU TO NOD OFF OR FALL ASLEEP WHILE SITTING AND TALKING TO SOMEONE: WOULD NEVER DOZE
HOW LIKELY ARE YOU TO NOD OFF OR FALL ASLEEP IN A CAR, WHILE STOPPED FOR A FEW MINUTES IN TRAFFIC: WOULD NEVER DOZE
HOW LIKELY ARE YOU TO NOD OFF OR FALL ASLEEP WHEN YOU ARE A PASSENGER IN A CAR FOR AN HOUR WITHOUT A BREAK: SLIGHT CHANCE OF DOZING
HOW LIKELY ARE YOU TO NOD OFF OR FALL ASLEEP WHEN YOU ARE A PASSENGER IN A CAR FOR AN HOUR WITHOUT A BREAK: SLIGHT CHANCE OF DOZING
HOW LIKELY ARE YOU TO NOD OFF OR FALL ASLEEP WHILE LYING DOWN TO REST IN THE AFTERNOON WHEN CIRCUMSTANCES PERMIT: MODERATE CHANCE OF DOZING
HOW LIKELY ARE YOU TO NOD OFF OR FALL ASLEEP IN A CAR, WHILE STOPPED FOR A FEW MINUTES IN TRAFFIC: WOULD NEVER DOZE
HOW LIKELY ARE YOU TO NOD OFF OR FALL ASLEEP WHILE SITTING AND TALKING TO SOMEONE: WOULD NEVER DOZE
HOW LIKELY ARE YOU TO NOD OFF OR FALL ASLEEP WHILE WATCHING TV: MODERATE CHANCE OF DOZING
HOW LIKELY ARE YOU TO NOD OFF OR FALL ASLEEP WHILE SITTING QUIETLY AFTER LUNCH WITHOUT ALCOHOL: SLIGHT CHANCE OF DOZING
HOW LIKELY ARE YOU TO NOD OFF OR FALL ASLEEP WHILE SITTING AND READING: SLIGHT CHANCE OF DOZING
HOW LIKELY ARE YOU TO NOD OFF OR FALL ASLEEP WHILE SITTING INACTIVE IN A PUBLIC PLACE: SLIGHT CHANCE OF DOZING
HOW LIKELY ARE YOU TO NOD OFF OR FALL ASLEEP WHILE SITTING QUIETLY AFTER LUNCH WITHOUT ALCOHOL: SLIGHT CHANCE OF DOZING
HOW LIKELY ARE YOU TO NOD OFF OR FALL ASLEEP WHILE WATCHING TV: MODERATE CHANCE OF DOZING
HOW LIKELY ARE YOU TO NOD OFF OR FALL ASLEEP WHILE SITTING INACTIVE IN A PUBLIC PLACE: SLIGHT CHANCE OF DOZING
ESS TOTAL SCORE: 8

## 2024-10-21 ENCOUNTER — OFFICE VISIT (OUTPATIENT)
Dept: PULMONOLOGY | Age: 64
End: 2024-10-21
Payer: COMMERCIAL

## 2024-10-21 VITALS
HEIGHT: 76 IN | SYSTOLIC BLOOD PRESSURE: 98 MMHG | HEART RATE: 90 BPM | OXYGEN SATURATION: 98 % | DIASTOLIC BLOOD PRESSURE: 60 MMHG | WEIGHT: 315 LBS | BODY MASS INDEX: 38.36 KG/M2

## 2024-10-21 DIAGNOSIS — G47.33 OSA (OBSTRUCTIVE SLEEP APNEA): Primary | ICD-10-CM

## 2024-10-21 DIAGNOSIS — I10 HYPERTENSION, UNSPECIFIED TYPE: ICD-10-CM

## 2024-10-21 DIAGNOSIS — J30.1 NON-SEASONAL ALLERGIC RHINITIS DUE TO POLLEN: ICD-10-CM

## 2024-10-21 DIAGNOSIS — E66.01 CLASS 3 SEVERE OBESITY WITH SERIOUS COMORBIDITY AND BODY MASS INDEX (BMI) OF 40.0 TO 44.9 IN ADULT, UNSPECIFIED OBESITY TYPE: ICD-10-CM

## 2024-10-21 DIAGNOSIS — I48.91 NEW ONSET ATRIAL FIBRILLATION (HCC): ICD-10-CM

## 2024-10-21 DIAGNOSIS — E66.813 CLASS 3 SEVERE OBESITY WITH SERIOUS COMORBIDITY AND BODY MASS INDEX (BMI) OF 40.0 TO 44.9 IN ADULT, UNSPECIFIED OBESITY TYPE: ICD-10-CM

## 2024-10-21 PROCEDURE — 3074F SYST BP LT 130 MM HG: CPT | Performed by: INTERNAL MEDICINE

## 2024-10-21 PROCEDURE — 99214 OFFICE O/P EST MOD 30 MIN: CPT | Performed by: INTERNAL MEDICINE

## 2024-10-21 PROCEDURE — 3078F DIAST BP <80 MM HG: CPT | Performed by: INTERNAL MEDICINE

## 2024-10-21 NOTE — PROGRESS NOTES
errors and incomplete sentences are occasional consequences of this technology due to software limitations. If there are questions or concerns about the content of this note of information contained within the body of this dictation they should be addressed with the provider for clarification.

## 2024-11-11 ENCOUNTER — TELEPHONE (OUTPATIENT)
Dept: CARDIOLOGY CLINIC | Age: 64
End: 2024-11-11

## 2024-11-11 NOTE — TELEPHONE ENCOUNTER
Date Of Procedure:  12/06/24    Time Of Arrival: 12:30pm     Procedure Time: 1:30pm       Called and spoke to patient and he is agreeable to the date and time. Reviewed the Pre-Procedure instructions and they also asked to be sent to [employer] as well. verbalized understanding. Encouraged to call with any questions or concerns.       Published on [employer] / emailed to Cath lab / note in chart / scheduled in [employer]/ [employer]

## 2024-11-11 NOTE — TELEPHONE ENCOUNTER
----- Message from Dr. Wes Murcia MD sent at 11/11/2024 10:35 AM EST -----  Hi    Can you add him for ablation on 12/6/24, PFA    Thanks

## 2024-11-12 PROBLEM — I48.91 A-FIB (HCC): Status: ACTIVE | Noted: 2024-07-31

## 2024-11-13 RX ORDER — APIXABAN 5 MG/1
5 TABLET, FILM COATED ORAL 2 TIMES DAILY
Qty: 180 TABLET | Refills: 3 | Status: SHIPPED | OUTPATIENT
Start: 2024-11-13

## 2024-11-13 RX ORDER — METOPROLOL TARTRATE 50 MG
50 TABLET ORAL 2 TIMES DAILY
Qty: 180 TABLET | Refills: 3 | Status: SHIPPED | OUTPATIENT
Start: 2024-11-13

## 2024-11-13 NOTE — TELEPHONE ENCOUNTER
Requested Prescriptions     Pending Prescriptions Disp Refills    metoprolol tartrate (LOPRESSOR) 50 MG tablet 180 tablet 3     Sig: Take 1 tablet by mouth 2 times daily      [facility]      Last OV:  2024 MXA    Next OV: 2025 KJC recall     Last EK2024     Last Filled: 2024 NPSR

## 2024-11-14 RX ORDER — MONTELUKAST SODIUM 10 MG/1
10 TABLET ORAL DAILY
Qty: 30 TABLET | Refills: 5 | Status: SHIPPED | OUTPATIENT
Start: 2024-11-14

## 2024-12-05 ENCOUNTER — ANESTHESIA EVENT (OUTPATIENT)
Age: 64
End: 2024-12-05
Payer: COMMERCIAL

## 2024-12-06 ENCOUNTER — APPOINTMENT (OUTPATIENT)
Age: 64
End: 2024-12-06
Attending: INTERNAL MEDICINE
Payer: COMMERCIAL

## 2024-12-06 ENCOUNTER — HOSPITAL ENCOUNTER (OUTPATIENT)
Age: 64
Setting detail: OUTPATIENT SURGERY
Discharge: HOME | End: 2024-12-06
Attending: INTERNAL MEDICINE | Admitting: INTERNAL MEDICINE
Payer: COMMERCIAL

## 2024-12-06 ENCOUNTER — ANESTHESIA (OUTPATIENT)
Age: 64
End: 2024-12-06
Payer: COMMERCIAL

## 2024-12-06 VITALS
HEART RATE: 64 BPM | OXYGEN SATURATION: 95 % | HEIGHT: 76 IN | BODY MASS INDEX: 38.36 KG/M2 | DIASTOLIC BLOOD PRESSURE: 57 MMHG | WEIGHT: 315 LBS | RESPIRATION RATE: 21 BRPM | SYSTOLIC BLOOD PRESSURE: 88 MMHG | TEMPERATURE: 98.2 F

## 2024-12-06 DIAGNOSIS — I48.19 PERSISTENT ATRIAL FIBRILLATION (HCC): ICD-10-CM

## 2024-12-06 DIAGNOSIS — I48.91 A-FIB (HCC): ICD-10-CM

## 2024-12-06 LAB
ANION GAP SERPL CALCULATED.3IONS-SCNC: 11 MMOL/L (ref 3–16)
BUN SERPL-MCNC: 11 MG/DL (ref 7–20)
CALCIUM SERPL-MCNC: 9.3 MG/DL (ref 8.3–10.6)
CHLORIDE SERPL-SCNC: 102 MMOL/L (ref 99–110)
CO2 SERPL-SCNC: 27 MMOL/L (ref 21–32)
CREAT SERPL-MCNC: 0.9 MG/DL (ref 0.8–1.3)
DEPRECATED RDW RBC AUTO: 13.6 % (ref 12.4–15.4)
ECHO BSA: 2.88 M2
ECHO BSA: 2.88 M2
EKG ATRIAL RATE: 89 BPM
EKG DIAGNOSIS: NORMAL
EKG Q-T INTERVAL: 368 MS
EKG QRS DURATION: 96 MS
EKG QTC CALCULATION (BAZETT): 469 MS
EKG R AXIS: 18 DEGREES
EKG T AXIS: 21 DEGREES
EKG VENTRICULAR RATE: 98 BPM
GFR SERPLBLD CREATININE-BSD FMLA CKD-EPI: >90 ML/MIN/{1.73_M2}
GLUCOSE BLD-MCNC: 210 MG/DL (ref 70–99)
GLUCOSE SERPL-MCNC: 207 MG/DL (ref 70–99)
HCT VFR BLD AUTO: 43.5 % (ref 40.5–52.5)
HGB BLD-MCNC: 15.3 G/DL (ref 13.5–17.5)
MCH RBC QN AUTO: 31.1 PG (ref 26–34)
MCHC RBC AUTO-ENTMCNC: 35.1 G/DL (ref 31–36)
MCV RBC AUTO: 88.5 FL (ref 80–100)
PERFORMED ON: ABNORMAL
PLATELET # BLD AUTO: 243 K/UL (ref 135–450)
PMV BLD AUTO: 7.2 FL (ref 5–10.5)
POC ACT LR: 253 SEC
POC ACT LR: 287 SEC
POC ACT LR: 345 SEC
POC ACT LR: 347 SEC
POC ACT LR: >400 SEC
POTASSIUM SERPL-SCNC: 3.5 MMOL/L (ref 3.5–5.1)
RBC # BLD AUTO: 4.91 M/UL (ref 4.2–5.9)
SODIUM SERPL-SCNC: 140 MMOL/L (ref 136–145)
WBC # BLD AUTO: 7.4 K/UL (ref 4–11)

## 2024-12-06 PROCEDURE — 7100000010 HC PHASE II RECOVERY - FIRST 15 MIN: Performed by: INTERNAL MEDICINE

## 2024-12-06 PROCEDURE — 85027 COMPLETE CBC AUTOMATED: CPT

## 2024-12-06 PROCEDURE — C1730 CATH, EP, 19 OR FEW ELECT: HCPCS | Performed by: INTERNAL MEDICINE

## 2024-12-06 PROCEDURE — 93656 COMPRE EP EVAL ABLTJ ATR FIB: CPT | Performed by: INTERNAL MEDICINE

## 2024-12-06 PROCEDURE — C1769 GUIDE WIRE: HCPCS | Performed by: INTERNAL MEDICINE

## 2024-12-06 PROCEDURE — 3700000001 HC ADD 15 MINUTES (ANESTHESIA): Performed by: INTERNAL MEDICINE

## 2024-12-06 PROCEDURE — 7100000011 HC PHASE II RECOVERY - ADDTL 15 MIN: Performed by: INTERNAL MEDICINE

## 2024-12-06 PROCEDURE — C1732 CATH, EP, DIAG/ABL, 3D/VECT: HCPCS | Performed by: INTERNAL MEDICINE

## 2024-12-06 PROCEDURE — C1894 INTRO/SHEATH, NON-LASER: HCPCS | Performed by: INTERNAL MEDICINE

## 2024-12-06 PROCEDURE — 2500000003 HC RX 250 WO HCPCS: Performed by: REGISTERED NURSE

## 2024-12-06 PROCEDURE — 93312 ECHO TRANSESOPHAGEAL: CPT

## 2024-12-06 PROCEDURE — 6360000002 HC RX W HCPCS

## 2024-12-06 PROCEDURE — 85347 COAGULATION TIME ACTIVATED: CPT

## 2024-12-06 PROCEDURE — C1759 CATH, INTRA ECHOCARDIOGRAPHY: HCPCS | Performed by: INTERNAL MEDICINE

## 2024-12-06 PROCEDURE — 93005 ELECTROCARDIOGRAM TRACING: CPT

## 2024-12-06 PROCEDURE — 7100000000 HC PACU RECOVERY - FIRST 15 MIN: Performed by: INTERNAL MEDICINE

## 2024-12-06 PROCEDURE — 7100000001 HC PACU RECOVERY - ADDTL 15 MIN: Performed by: INTERNAL MEDICINE

## 2024-12-06 PROCEDURE — C1760 CLOSURE DEV, VASC: HCPCS | Performed by: INTERNAL MEDICINE

## 2024-12-06 PROCEDURE — 6360000002 HC RX W HCPCS: Performed by: REGISTERED NURSE

## 2024-12-06 PROCEDURE — 2720000010 HC SURG SUPPLY STERILE: Performed by: INTERNAL MEDICINE

## 2024-12-06 PROCEDURE — C1766 INTRO/SHEATH,STRBLE,NON-PEEL: HCPCS | Performed by: INTERNAL MEDICINE

## 2024-12-06 PROCEDURE — 36415 COLL VENOUS BLD VENIPUNCTURE: CPT

## 2024-12-06 PROCEDURE — 2709999900 HC NON-CHARGEABLE SUPPLY: Performed by: INTERNAL MEDICINE

## 2024-12-06 PROCEDURE — 3700000000 HC ANESTHESIA ATTENDED CARE: Performed by: INTERNAL MEDICINE

## 2024-12-06 PROCEDURE — 80048 BASIC METABOLIC PNL TOTAL CA: CPT

## 2024-12-06 PROCEDURE — 6370000000 HC RX 637 (ALT 250 FOR IP)

## 2024-12-06 PROCEDURE — 2580000003 HC RX 258: Performed by: REGISTERED NURSE

## 2024-12-06 PROCEDURE — C1733 CATH, EP, OTHR THAN COOL-TIP: HCPCS | Performed by: INTERNAL MEDICINE

## 2024-12-06 PROCEDURE — 6360000002 HC RX W HCPCS: Performed by: INTERNAL MEDICINE

## 2024-12-06 RX ORDER — FENTANYL CITRATE 50 UG/ML
INJECTION, SOLUTION INTRAMUSCULAR; INTRAVENOUS
Status: DISCONTINUED | OUTPATIENT
Start: 2024-12-06 | End: 2024-12-06 | Stop reason: SDUPTHER

## 2024-12-06 RX ORDER — DIPHENHYDRAMINE HYDROCHLORIDE 50 MG/ML
12.5 INJECTION INTRAMUSCULAR; INTRAVENOUS
Status: DISCONTINUED | OUTPATIENT
Start: 2024-12-06 | End: 2024-12-06 | Stop reason: HOSPADM

## 2024-12-06 RX ORDER — SODIUM CHLORIDE 0.9 % (FLUSH) 0.9 %
5-40 SYRINGE (ML) INJECTION PRN
Status: DISCONTINUED | OUTPATIENT
Start: 2024-12-06 | End: 2024-12-06 | Stop reason: HOSPADM

## 2024-12-06 RX ORDER — MIDAZOLAM HYDROCHLORIDE 1 MG/ML
INJECTION, SOLUTION INTRAMUSCULAR; INTRAVENOUS
Status: DISCONTINUED | OUTPATIENT
Start: 2024-12-06 | End: 2024-12-06 | Stop reason: SDUPTHER

## 2024-12-06 RX ORDER — LIDOCAINE HYDROCHLORIDE 20 MG/ML
INJECTION, SOLUTION EPIDURAL; INFILTRATION; INTRACAUDAL; PERINEURAL
Status: DISCONTINUED | OUTPATIENT
Start: 2024-12-06 | End: 2024-12-06 | Stop reason: SDUPTHER

## 2024-12-06 RX ORDER — PROCHLORPERAZINE EDISYLATE 5 MG/ML
5 INJECTION INTRAMUSCULAR; INTRAVENOUS
Status: DISCONTINUED | OUTPATIENT
Start: 2024-12-06 | End: 2024-12-06 | Stop reason: HOSPADM

## 2024-12-06 RX ORDER — PHENYLEPHRINE HCL IN 0.9% NACL 1 MG/10 ML
SYRINGE (ML) INTRAVENOUS
Status: DISCONTINUED | OUTPATIENT
Start: 2024-12-06 | End: 2024-12-06 | Stop reason: SDUPTHER

## 2024-12-06 RX ORDER — SUCCINYLCHOLINE/SOD CL,ISO/PF 200MG/10ML
SYRINGE (ML) INTRAVENOUS
Status: DISCONTINUED | OUTPATIENT
Start: 2024-12-06 | End: 2024-12-06 | Stop reason: SDUPTHER

## 2024-12-06 RX ORDER — HEPARIN SODIUM 1000 [USP'U]/ML
INJECTION, SOLUTION INTRAVENOUS; SUBCUTANEOUS
Status: DISCONTINUED | OUTPATIENT
Start: 2024-12-06 | End: 2024-12-06 | Stop reason: SDUPTHER

## 2024-12-06 RX ORDER — OXYCODONE HYDROCHLORIDE 5 MG/1
5 TABLET ORAL
Status: DISCONTINUED | OUTPATIENT
Start: 2024-12-06 | End: 2024-12-06 | Stop reason: HOSPADM

## 2024-12-06 RX ORDER — LORAZEPAM 2 MG/ML
0.5 INJECTION INTRAMUSCULAR
Status: DISCONTINUED | OUTPATIENT
Start: 2024-12-06 | End: 2024-12-06 | Stop reason: HOSPADM

## 2024-12-06 RX ORDER — DEXAMETHASONE SODIUM PHOSPHATE 4 MG/ML
INJECTION, SOLUTION INTRA-ARTICULAR; INTRALESIONAL; INTRAMUSCULAR; INTRAVENOUS; SOFT TISSUE
Status: DISCONTINUED | OUTPATIENT
Start: 2024-12-06 | End: 2024-12-06 | Stop reason: SDUPTHER

## 2024-12-06 RX ORDER — ACETAMINOPHEN 325 MG/1
TABLET ORAL
Status: COMPLETED
Start: 2024-12-06 | End: 2024-12-06

## 2024-12-06 RX ORDER — ONDANSETRON 2 MG/ML
INJECTION INTRAMUSCULAR; INTRAVENOUS
Status: DISCONTINUED | OUTPATIENT
Start: 2024-12-06 | End: 2024-12-06 | Stop reason: SDUPTHER

## 2024-12-06 RX ORDER — TETRACAINE HYDROCHLORIDE 5 MG/ML
2 SOLUTION OPHTHALMIC ONCE
Status: DISCONTINUED | OUTPATIENT
Start: 2024-12-06 | End: 2024-12-06 | Stop reason: HOSPADM

## 2024-12-06 RX ORDER — METHOCARBAMOL 100 MG/ML
INJECTION, SOLUTION INTRAMUSCULAR; INTRAVENOUS
Status: COMPLETED
Start: 2024-12-06 | End: 2024-12-06

## 2024-12-06 RX ORDER — SODIUM CHLORIDE 0.9 % (FLUSH) 0.9 %
5-40 SYRINGE (ML) INJECTION EVERY 12 HOURS SCHEDULED
Status: DISCONTINUED | OUTPATIENT
Start: 2024-12-06 | End: 2024-12-06 | Stop reason: HOSPADM

## 2024-12-06 RX ORDER — GLYCOPYRROLATE 0.2 MG/ML
INJECTION INTRAMUSCULAR; INTRAVENOUS
Status: DISCONTINUED | OUTPATIENT
Start: 2024-12-06 | End: 2024-12-06 | Stop reason: SDUPTHER

## 2024-12-06 RX ORDER — IPRATROPIUM BROMIDE AND ALBUTEROL SULFATE 2.5; .5 MG/3ML; MG/3ML
1 SOLUTION RESPIRATORY (INHALATION)
Status: DISCONTINUED | OUTPATIENT
Start: 2024-12-06 | End: 2024-12-06 | Stop reason: HOSPADM

## 2024-12-06 RX ORDER — SODIUM CHLORIDE 9 MG/ML
INJECTION, SOLUTION INTRAVENOUS PRN
Status: DISCONTINUED | OUTPATIENT
Start: 2024-12-06 | End: 2024-12-06 | Stop reason: HOSPADM

## 2024-12-06 RX ORDER — PROPOFOL 10 MG/ML
INJECTION, EMULSION INTRAVENOUS
Status: DISCONTINUED | OUTPATIENT
Start: 2024-12-06 | End: 2024-12-06 | Stop reason: SDUPTHER

## 2024-12-06 RX ORDER — METHOCARBAMOL 100 MG/ML
1000 INJECTION, SOLUTION INTRAMUSCULAR; INTRAVENOUS ONCE
Status: COMPLETED | OUTPATIENT
Start: 2024-12-06 | End: 2024-12-06

## 2024-12-06 RX ORDER — SODIUM CHLORIDE 9 MG/ML
INJECTION, SOLUTION INTRAVENOUS
Status: DISCONTINUED | OUTPATIENT
Start: 2024-12-06 | End: 2024-12-06 | Stop reason: SDUPTHER

## 2024-12-06 RX ORDER — LABETALOL HYDROCHLORIDE 5 MG/ML
10 INJECTION, SOLUTION INTRAVENOUS
Status: DISCONTINUED | OUTPATIENT
Start: 2024-12-06 | End: 2024-12-06 | Stop reason: HOSPADM

## 2024-12-06 RX ORDER — ONDANSETRON 2 MG/ML
4 INJECTION INTRAMUSCULAR; INTRAVENOUS
Status: DISCONTINUED | OUTPATIENT
Start: 2024-12-06 | End: 2024-12-06 | Stop reason: HOSPADM

## 2024-12-06 RX ORDER — HYDROMORPHONE HYDROCHLORIDE 2 MG/ML
0.5 INJECTION, SOLUTION INTRAMUSCULAR; INTRAVENOUS; SUBCUTANEOUS EVERY 5 MIN PRN
Status: DISCONTINUED | OUTPATIENT
Start: 2024-12-06 | End: 2024-12-06 | Stop reason: HOSPADM

## 2024-12-06 RX ORDER — NALOXONE HYDROCHLORIDE 0.4 MG/ML
INJECTION, SOLUTION INTRAMUSCULAR; INTRAVENOUS; SUBCUTANEOUS PRN
Status: DISCONTINUED | OUTPATIENT
Start: 2024-12-06 | End: 2024-12-06 | Stop reason: HOSPADM

## 2024-12-06 RX ORDER — ACETAMINOPHEN 325 MG/1
650 TABLET ORAL ONCE
Status: COMPLETED | OUTPATIENT
Start: 2024-12-06 | End: 2024-12-06

## 2024-12-06 RX ORDER — FENTANYL CITRATE 50 UG/ML
25 INJECTION, SOLUTION INTRAMUSCULAR; INTRAVENOUS EVERY 5 MIN PRN
Status: DISCONTINUED | OUTPATIENT
Start: 2024-12-06 | End: 2024-12-06 | Stop reason: HOSPADM

## 2024-12-06 RX ORDER — ROCURONIUM BROMIDE 10 MG/ML
INJECTION, SOLUTION INTRAVENOUS
Status: DISCONTINUED | OUTPATIENT
Start: 2024-12-06 | End: 2024-12-06 | Stop reason: SDUPTHER

## 2024-12-06 RX ADMIN — LIDOCAINE HYDROCHLORIDE 100 MG: 20 INJECTION, SOLUTION EPIDURAL; INFILTRATION; INTRACAUDAL; PERINEURAL at 08:00

## 2024-12-06 RX ADMIN — Medication 160 MG: at 08:00

## 2024-12-06 RX ADMIN — SODIUM CHLORIDE: 9 INJECTION, SOLUTION INTRAVENOUS at 07:54

## 2024-12-06 RX ADMIN — ROCURONIUM BROMIDE 20 MG: 10 INJECTION, SOLUTION INTRAVENOUS at 09:12

## 2024-12-06 RX ADMIN — METHOCARBAMOL 1000 MG: 100 INJECTION INTRAMUSCULAR; INTRAVENOUS at 11:51

## 2024-12-06 RX ADMIN — Medication 200 MCG: at 08:05

## 2024-12-06 RX ADMIN — MIDAZOLAM 2 MG: 1 INJECTION INTRAMUSCULAR; INTRAVENOUS at 07:58

## 2024-12-06 RX ADMIN — Medication 300 MCG: at 08:19

## 2024-12-06 RX ADMIN — PROPOFOL 50 MG: 10 INJECTION, EMULSION INTRAVENOUS at 09:07

## 2024-12-06 RX ADMIN — HEPARIN SODIUM 8000 UNITS: 1000 INJECTION INTRAVENOUS; SUBCUTANEOUS at 09:08

## 2024-12-06 RX ADMIN — FENTANYL CITRATE 100 MCG: 50 INJECTION, SOLUTION INTRAMUSCULAR; INTRAVENOUS at 08:00

## 2024-12-06 RX ADMIN — PHENYLEPHRINE HYDROCHLORIDE 50 MCG/MIN: 10 INJECTION INTRAVENOUS at 08:19

## 2024-12-06 RX ADMIN — PROPOFOL 50 MG: 10 INJECTION, EMULSION INTRAVENOUS at 09:01

## 2024-12-06 RX ADMIN — SUGAMMADEX 300 MG: 100 INJECTION, SOLUTION INTRAVENOUS at 10:00

## 2024-12-06 RX ADMIN — DEXAMETHASONE SODIUM PHOSPHATE 4 MG: 4 INJECTION, SOLUTION INTRAMUSCULAR; INTRAVENOUS at 08:10

## 2024-12-06 RX ADMIN — ACETAMINOPHEN 650 MG: 325 TABLET ORAL at 15:36

## 2024-12-06 RX ADMIN — ONDANSETRON 4 MG: 2 INJECTION INTRAMUSCULAR; INTRAVENOUS at 08:10

## 2024-12-06 RX ADMIN — GLYCOPYRROLATE 0.4 MG: 0.2 INJECTION, SOLUTION INTRAMUSCULAR; INTRAVENOUS at 09:00

## 2024-12-06 RX ADMIN — METHOCARBAMOL 1000 MG: 100 INJECTION, SOLUTION INTRAMUSCULAR; INTRAVENOUS at 11:51

## 2024-12-06 RX ADMIN — HEPARIN SODIUM 4000 UNITS: 1000 INJECTION INTRAVENOUS; SUBCUTANEOUS at 09:25

## 2024-12-06 RX ADMIN — PROPOFOL 250 MG: 10 INJECTION, EMULSION INTRAVENOUS at 08:00

## 2024-12-06 RX ADMIN — ROCURONIUM BROMIDE 50 MG: 10 INJECTION, SOLUTION INTRAVENOUS at 09:07

## 2024-12-06 RX ADMIN — HEPARIN SODIUM 14000 UNITS: 1000 INJECTION INTRAVENOUS; SUBCUTANEOUS at 08:35

## 2024-12-06 RX ADMIN — Medication 200 MCG: at 08:10

## 2024-12-06 RX ADMIN — HEPARIN SODIUM 5000 UNITS: 1000 INJECTION INTRAVENOUS; SUBCUTANEOUS at 08:53

## 2024-12-06 NOTE — DISCHARGE INSTRUCTIONS
Discharge Instructions    Care of your puncture site:  Remove bandage 24 hours after the procedure.  May shower in 24 hours but do not sit in a bathtub/pool of water for 5 days or until the wound is healed.  Inspect the site daily and gently clean using soap and water while standing in the shower.  Dry thoroughly and apply a Band-Aid that covers the entire site. Do not apply powder or lotion.    Normal Observations:  Soreness or tenderness which may last one week.  Mild oozing from the incision site.  Possible bruising that could last 2 weeks.    Activity:  You may resume driving 24 hours following the procedure.  You may resume normal activity in 5 days or after the wound heals.  Avoid lifting more than 10 pounds for 5 days or until the wound heals.  Avoid strenuous exercise or activity for 1 week.      Nutrition:  Regular diet   Drink at least 8 to 10 glasses of decaffeinated, non-alcoholic fluid for the next 24 hours to flush the x-ray dye used for your angiogram out of your body.    Call your doctor immediately if your condition worsens, for any other concerns, for a follow-up appointment or if you experience any of the following:  Significant bleeding that does not stop after 10 minutes of applying firm pressure on the puncture site.  Increased swelling on the groin or leg.  Unusual pain, numbness, or tingling of the groin or down the leg.  Any signs of infection such as: redness, yellow drainage at the site, swelling or pain.    Other Instructions:  Hold Metformin or Metformin containing drugs for 48 hours after procedure.       ANESTHESIA DISCHARGE INSTRUCTIONS    Wear your seatbelt home.  You are under the influence of drugs-do not drink alcohol, drive, operate machinery, make any important decisions or sign any legal documents for 24 hours.  Children should not ride bikes, skate boards or play on gym sets for 24 hours after surgery.  A responsible adult needs to be with you for 24 hours.  You may experience

## 2024-12-06 NOTE — H&P
[facility]   Electrophysiology CC: AFIB  HPI: Faheem Gomez is a 64 y.o. male with past medical history of DM, hypothyroid.  Patient saw PCP for annual exam. New onset atrial fibrillation noted on EKG.. Patient was started on Eliquis .     Stress test completed 11/30/2022 was abnormal. Insurance required a Coronary CTA before proceeding with [facility]. Thrombus was noted on CTA and patient underwent a subsequent PREETI which showed no thrombus.    Interval History:    Faheem presents for follow up for his AFIB. States that he is having no symptoms of AFIB. States he is having more bruising from his eliquis. Says he  is still considering the ablation.       Assessment and plan:   Persistent atrial fibrillation.    -ECG today shows A fib   -MCOT 11/3/2022 showed 30% AF burden    -New noted at PCP office 11/01/2022 - unsure of duration   -Patient has a [id number] Score of  2 (DM, HTN ),Continue eliquis 5 mg BID   -Continue lopressor to 50 mg BID for rate control   -TSH  3.67 06/05/2023    - 2 week holter 7/13/2023, 100% Afib controlled rate.4 NSVT  --We discussed treatment options including antiarrhythmics, rate control with anticoagulation, and ablation.   -We discussed progressive nature of atrial fibrillation. Treatment success decreases when AF becomes persistent and last more than 6 months.    -Antiarrhythmic therapy, side effects, benefits and alternative discussed.     -Atrial fibrillation ablation procedure was discussed.  We discussed the need for repeat procedure. On average patients may need more than one ablation procedure.     -Risks associated with ablation include but not limited to allergic reaction to the medications, pain, bleeding, infection, nerve injury, injury to diaphragm(breathing muscle), pulmonary embolus(blood clot in lungs), deep vein blood clot, pneumothorax, hemothorax, acute renal failure, cardiac perforation,  tamponade, need for emergent surgery (open heart), permanent pacemaker,  11/02/2022    Suspected sleep apnea 11/02/2022    Medial meniscus tear 07/25/2016     Diagnostic studies:   ECG 7/31/24  AFIB, QTcH 431,QRS 90    2 week holter 7/13/2023  100% Afib controlled rate  4 NSVT    PREETI 12/15/2022  Summary  Normal left ventricle size, wall thickness, and systolic function with an estimated ejection fraction of 55-60%.  There is no evidence of mass or thrombus in the left atrium or appendage.   Aneurysmal intra atrial septum.    CTA 12/9/2022  IMPRESSION :        Thrombus in the left atrial appendage. Confirmation with echocardiogram is indicated.       CAD RADS category 2 stenosis in the proximal LAD. Otherwise patent coronary arteries.       Calcium score 61         Echo 11/30/2022  Normal left ventricle size, wall thickness, and systolic function with an estimated ejection fraction of 55-60%. No regional wall motion  abnormalities are seen.  Normal diastolic filling pattern for age.  The right ventricle is mildly dilated with normal function.  No significant valvular abnormalities.    Stress Test 11/30/2022  Normal LV size and systolic function.  Left ventricular ejection fraction of 66%.  There is a small to moderate, mostly reversible inferolateral defect, suggestive of myocardium at risk.  Overall, findings represent an intermediate risk scan.    Echo 6/27/2014   Summary    Normal left ventricle size and systolic function with an estimated ejection    fraction of 55%.    Mild concentric left ventricular hypertrophy is present.    Trivial mitral regurgitation is present.    The left atrium is dilated.    There is mild tricuspid regurgitation with RVSP estimated at 34 mmHg.    I independently reviewed the cardiac diagnostic studies, ECG and relevant imaging studies.     Lab Results   Component Value Date    LVEF 58 12/15/2022     No results found for: \"TSHFT4\", \"TSH\"    Physical Examination:  Vitals:    07/31/24 1530   BP: 114/84   Pulse: 82   SpO2: 98%       Wt Readings from Last 3

## 2024-12-06 NOTE — PROGRESS NOTES
Pt c/o back pain \"arthritic back\" - remains flat per orders. HOB elevated 30 degrees. Spoke with Dr Benítez - Robaxin 1000mg IV given, Lunch tray ordered

## 2024-12-06 NOTE — PROGRESS NOTES
Pt dressed, Attari at bedside to see pt, pt getting in wheelchair to leave and felt  \"something cool\" on his leg,\" rechecked pt and jose capone noted from groin, pt placed back in bed. Manual Pressure placed on to R groin

## 2024-12-06 NOTE — PROGRESS NOTES
Pt ambulated to BR, groin remains CD&I, vss, pt discharged via wheelchair to car, no complications. Discharge complete

## 2024-12-06 NOTE — PROGRESS NOTES
Ambulated pt. Pt tolerated well. Was able to void, no complaints, discussed discharge instructions with pt. Awaiting to see Dr Murcia before discharging.

## 2024-12-06 NOTE — PROCEDURES
[facility]     Electrophysiology Procedure Note       Date of Procedure: 12/6/2024  Patient's Name: Faheem Gomez  YOB: 1960   Medical Record Number: [id number]  Referring Physician: Jose Mcwilliams MD  Procedure Performed by: Wes Murcia MD    Procedure performed:     Electrophysiology study with left atrial recording and mapping   3-D electroanatomical mapping of the left atrium and  right atium.       Transseptal puncture through an intact septum .   Intracardiac echocardiography.   PFA ablation of atrial fibrillation and pulmonary veins isolation         External cardioversion of the atrial arrhythmias   Deployment of closure device on all   access sites.                   Indications for procedure:   Symptomatic atrial fibrillation, failed antiarrhythmic therapy and cardioversion in the past   Faheem Gomez is a 64 y.o. male   Due to failure of antiarrhythmic therapy in the past, patient has been scheduled to have ablation for symptomatic atrial fibrillation.     Details of Procedure:   The risks, benefits and alternatives of the ablation procedure were discussed with the patient. The risks including, but not limited to, the risks of bleeding, infection, radiation exposure, injury to vascular, cardiac and surrounding structures (including pneumothorax), stroke, cardiac perforation, tamponade, need for emergent open heart surgery, need for pacemaker implantation, esophageal injury and fistula, myocardial infarction and death were discussed in detail. The patient opted to proceed with the ablation. Written informed consent was signed and placed in the chart.     Patient was brought to the EP lab in a fasting non-sedate state. Patient underwent general anesthesia by anesthesia team. We initially performed a transesophageal echo that showed no left atrial appendage clot/thrombus.          Both groins were prepped in a sterile fashion. We gained access in Right femoral vein. A 9-Azeri

## 2024-12-06 NOTE — ANESTHESIA PRE PROCEDURE
Department of Anesthesiology  Preprocedure Note       Name:  Faheem Gomez   Age:  64 y.o.  :  1960                                          MRN:  [id number]         Date:  2024      Surgeon: Surgeon(s):  Wes Murcia MD    Procedure: Procedure(s):  Ablation A-fib w complete ep study    Medications prior to admission:   Prior to Admission medications    Medication Sig Start Date End Date Taking? Authorizing Provider   montelukast (SINGULAIR) 10 MG tablet Take 1 tablet by mouth daily 24  Yes Dionte Hernandez MD   ELIQUIS 5 MG TABS tablet Take 1 tablet by mouth 2 times daily 24  Yes Kirk Goetz APRN - CNP   metoprolol tartrate (LOPRESSOR) 50 MG tablet Take 1 tablet by mouth 2 times daily 24  Yes Kirk Goetz APRN - CNP   rosuvastatin (CRESTOR) 5 MG tablet Take 1 tablet by mouth daily 24  Yes Jose Stern MD   furosemide (LASIX) 20 MG tablet Take 1 tablet by mouth daily 24  Yes Jose Stern MD   Omega-3 Fatty Acids (FISH OIL) 1000 MG capsule Take 1 capsule by mouth nightly 23  Yes My Garces MD   aspirin EC 81 MG EC tablet Take 1 tablet by mouth daily 23  Yes Jose Stern MD   vitamin D (CHOLECALCIFEROL) 25 MCG (1000 UT) TABS tablet daily 22  Yes My Garces MD   metFORMIN (GLUCOPHAGE-XR) 500 MG extended release tablet Take by mouth 2 times daily 22  Yes My Garces MD   levothyroxine (SYNTHROID) 75 MCG tablet Take 1 tablet by mouth daily 9/8/22 10/1/26 Yes My Garces MD   Azelastine-Fluticasone 137-50 MCG/ACT SUSP 1 Squirt by Nasal route in the morning and at bedtime  Patient not taking: Reported on 2024 4/15/24   Dionte Hernandez MD   albuterol sulfate HFA (PROVENTIL;VENTOLIN;PROAIR) 108 (90 Base) MCG/ACT inhaler Inhale 2 puffs into the lungs every 6 hours as needed 22  ProviderMy MD   acetaminophen (APAP EXTRA STRENGTH) 500 MG tablet Take 2 tablets by mouth every 6

## 2024-12-06 NOTE — ANESTHESIA POSTPROCEDURE EVALUATION
Department of Anesthesiology  Postprocedure Note    Patient: Faheem Gomez  MRN: [id number]  YOB: 1960  Date of evaluation: 12/6/2024    Procedure Summary       Date: 12/06/24 Room / Location: [facility] EP LAB 5 / [facility] CARDIAC CATH LAB    Anesthesia Start: 0754 Anesthesia Stop: 1024    Procedure: Ablation A-fib w complete ep study Diagnosis:       A-fib (HCC)      (A-fib (HCC) [I48.91])    Providers: Wes Murcia MD Responsible Provider: Nik Benítez MD    Anesthesia Type: general ASA Status: 3            Anesthesia Type: No value filed.    Arturo Phase I: Arturo Score: 8    Arturo Phase II:      Anesthesia Post Evaluation    Patient location during evaluation: PACU  Patient participation: complete - patient participated  Level of consciousness: awake  Airway patency: patent  Nausea & Vomiting: no nausea and no vomiting  Cardiovascular status: blood pressure returned to baseline and hemodynamically stable  Respiratory status: acceptable  Hydration status: euvolemic  Multimodal analgesia pain management approach  Pain management: adequate    No notable events documented.

## 2024-12-06 NOTE — PROGRESS NOTES
After on/off manual pressure to R ruth, oozing has stopped, D - stat placed, Drsng in place, HOB elevated. Pt tolerating, groin remains CD&I. Neurovascular status intact.

## 2024-12-12 LAB — ECHO BSA: 2.88 M2

## 2025-01-22 NOTE — TELEPHONE ENCOUNTER
Received refill request for  furosemide (LASIX) 20 MG tablet  from [facility].     Last OV: 7/31/2024 MXA    Next OV: 3/18/2025 MXA    Last Labs: 12/6/2024 BMP    Last Filled: Received refill request for  rosuvastatin (CRESTOR) 5 MG tablet  from [facility].     Last OV: 7/31/2024 MXA    Next OV: 3/18/2025 MXA    Last Labs: 9/26/2023 Lipid    Last Filled: 7/8/2024 [facility]

## 2025-01-23 RX ORDER — MONTELUKAST SODIUM 10 MG/1
10 TABLET ORAL DAILY
Qty: 30 TABLET | Refills: 5 | Status: SHIPPED | OUTPATIENT
Start: 2025-01-23

## 2025-01-23 RX ORDER — ROSUVASTATIN CALCIUM 5 MG/1
5 TABLET, COATED ORAL DAILY
Qty: 90 TABLET | Refills: 3 | Status: SHIPPED | OUTPATIENT
Start: 2025-01-23

## 2025-01-23 RX ORDER — FUROSEMIDE 20 MG/1
20 TABLET ORAL DAILY
Qty: 90 TABLET | Refills: 1 | Status: SHIPPED | OUTPATIENT
Start: 2025-01-23

## 2025-02-07 RX ORDER — METOPROLOL TARTRATE 50 MG
25 TABLET ORAL 2 TIMES DAILY
Qty: 180 TABLET | Refills: 3 | Status: SHIPPED | OUTPATIENT
Start: 2025-02-07

## 2025-03-12 NOTE — PROGRESS NOTES
[facility]   Electrophysiology Follow up   Date: 3/18/2025  I had the privilege of visiting Faheem Gomez in the office.     CC: PAF   HPI: Faheem Gomez is a 65 y.o. male with past medical history of DM, hypothyroid.  Patient saw PCP for annual exam. New onset atrial fibrillation noted on EKG.. Patient was started on Eliquis .      Stress test completed 11/30/2022 was abnormal. Insurance required a Coronary CTA before proceeding with [facility]. Thrombus was noted on CTA and patient underwent a subsequent PREETI which showed no thrombus.    12/6/2024 S/p PFA of AF with PVI    Interval History:  History of Present Illness  The patient presents for a follow-up of his atrial fibrillation post-ablation.    An ablation procedure was performed in December 2024, followed by a recovery period of one month. Subsequently, influenza and pneumonia were experienced from January 2025 to February 2025. Persistent fatigue was reported, attributed to metoprolol medication. His pulse rate remained consistently in the 40s. Consequently, a reduction in metoprolol dosage from 50 mg to 25 mg was requested and granted. Metoprolol has not been taken this morning.    Additionally, lower back pain is reported, initially thought to be due to poor sleeping posture, The pain is not exacerbated by touch.    SOCIAL HISTORY  Occupations: Teacher      FAMILY HISTORY  - Mother: Atrial fibrillation, scheduled for ablation    N/A for other first-degree relatives.      Assessment & Plan    Paroxsymal atrial fibrillation.               -ECG today shows SB @ 57 bpm   -S/p 12/6/2024 S/p PFA of AF with PVI              -MCOT 11/3/2022 showed 30% AF burden               -New noted at PCP office 11/01/2022 - unsure of duration              -2 week holter 7/13/2023, 100% Afib controlled rate.4 NSVT    -Patient has a [id number] Score of  2 (DM, HTN ),Continue eliquis 5 mg BID  - Continue  Eliquis as prescribed.  - Maintain an active lifestyle.  - Manage

## 2025-03-18 ENCOUNTER — HOSPITAL ENCOUNTER (OUTPATIENT)
Age: 65
Discharge: HOME | End: 2025-03-18
Payer: COMMERCIAL

## 2025-03-18 ENCOUNTER — OFFICE VISIT (OUTPATIENT)
Dept: CARDIOLOGY CLINIC | Age: 65
End: 2025-03-18
Payer: COMMERCIAL

## 2025-03-18 VITALS
BODY MASS INDEX: 38.36 KG/M2 | HEIGHT: 76 IN | DIASTOLIC BLOOD PRESSURE: 88 MMHG | SYSTOLIC BLOOD PRESSURE: 132 MMHG | WEIGHT: 315 LBS

## 2025-03-18 DIAGNOSIS — G47.33 OSA (OBSTRUCTIVE SLEEP APNEA): ICD-10-CM

## 2025-03-18 DIAGNOSIS — I10 PRIMARY HYPERTENSION: ICD-10-CM

## 2025-03-18 DIAGNOSIS — I25.10 CORONARY ARTERY DISEASE DUE TO LIPID RICH PLAQUE: ICD-10-CM

## 2025-03-18 DIAGNOSIS — I25.83 CORONARY ARTERY DISEASE DUE TO LIPID RICH PLAQUE: ICD-10-CM

## 2025-03-18 DIAGNOSIS — I48.19 PERSISTENT ATRIAL FIBRILLATION (HCC): Primary | ICD-10-CM

## 2025-03-18 LAB
ANION GAP SERPL CALCULATED.3IONS-SCNC: 13 MMOL/L (ref 3–16)
BUN SERPL-MCNC: 13 MG/DL (ref 7–20)
CALCIUM SERPL-MCNC: 9.3 MG/DL (ref 8.3–10.6)
CHLORIDE SERPL-SCNC: 104 MMOL/L (ref 99–110)
CO2 SERPL-SCNC: 24 MMOL/L (ref 21–32)
CREAT SERPL-MCNC: 0.7 MG/DL (ref 0.8–1.3)
GFR SERPLBLD CREATININE-BSD FMLA CKD-EPI: >90 ML/MIN/{1.73_M2}
GLUCOSE SERPL-MCNC: 180 MG/DL (ref 70–99)
NT-PROBNP SERPL-MCNC: 60 PG/ML (ref 0–124)
POTASSIUM SERPL-SCNC: 3.9 MMOL/L (ref 3.5–5.1)
SODIUM SERPL-SCNC: 141 MMOL/L (ref 136–145)

## 2025-03-18 PROCEDURE — 93000 ELECTROCARDIOGRAM COMPLETE: CPT | Performed by: INTERNAL MEDICINE

## 2025-03-18 PROCEDURE — 3079F DIAST BP 80-89 MM HG: CPT | Performed by: INTERNAL MEDICINE

## 2025-03-18 PROCEDURE — 3075F SYST BP GE 130 - 139MM HG: CPT | Performed by: INTERNAL MEDICINE

## 2025-03-18 PROCEDURE — 99214 OFFICE O/P EST MOD 30 MIN: CPT | Performed by: INTERNAL MEDICINE

## 2025-03-18 PROCEDURE — 83880 ASSAY OF NATRIURETIC PEPTIDE: CPT

## 2025-03-18 PROCEDURE — 36415 COLL VENOUS BLD VENIPUNCTURE: CPT

## 2025-03-18 PROCEDURE — G2211 COMPLEX E/M VISIT ADD ON: HCPCS | Performed by: INTERNAL MEDICINE

## 2025-03-18 PROCEDURE — 80048 BASIC METABOLIC PNL TOTAL CA: CPT

## 2025-03-18 RX ORDER — AMOXICILLIN 250 MG
1 CAPSULE ORAL DAILY
COMMUNITY
Start: 2025-02-24

## 2025-03-18 RX ORDER — METOPROLOL TARTRATE 25 MG/1
12.5 TABLET, FILM COATED ORAL 2 TIMES DAILY
Qty: 90 TABLET | Refills: 3 | Status: SHIPPED | OUTPATIENT
Start: 2025-03-18

## 2025-03-18 RX ORDER — METOPROLOL TARTRATE 25 MG/1
12.5 TABLET, FILM COATED ORAL 2 TIMES DAILY
Qty: 90 TABLET | Refills: 3 | Status: SHIPPED | OUTPATIENT
Start: 2025-03-18 | End: 2025-03-18 | Stop reason: SDUPTHER

## 2025-03-20 ENCOUNTER — OFFICE VISIT (OUTPATIENT)
Age: 65
End: 2025-03-20
Payer: COMMERCIAL

## 2025-03-20 VITALS
SYSTOLIC BLOOD PRESSURE: 112 MMHG | HEIGHT: 76 IN | HEART RATE: 69 BPM | WEIGHT: 315 LBS | BODY MASS INDEX: 38.36 KG/M2 | OXYGEN SATURATION: 99 % | DIASTOLIC BLOOD PRESSURE: 80 MMHG

## 2025-03-20 DIAGNOSIS — I25.83 CORONARY ARTERY DISEASE DUE TO LIPID RICH PLAQUE: Primary | ICD-10-CM

## 2025-03-20 DIAGNOSIS — M54.9 ACUTE BILATERAL BACK PAIN, UNSPECIFIED BACK LOCATION: ICD-10-CM

## 2025-03-20 DIAGNOSIS — E78.2 MIXED HYPERLIPIDEMIA: ICD-10-CM

## 2025-03-20 DIAGNOSIS — I25.10 CORONARY ARTERY DISEASE DUE TO LIPID RICH PLAQUE: Primary | ICD-10-CM

## 2025-03-20 DIAGNOSIS — I10 PRIMARY HYPERTENSION: ICD-10-CM

## 2025-03-20 DIAGNOSIS — I48.19 PERSISTENT ATRIAL FIBRILLATION (HCC): ICD-10-CM

## 2025-03-20 PROCEDURE — 3074F SYST BP LT 130 MM HG: CPT | Performed by: INTERNAL MEDICINE

## 2025-03-20 PROCEDURE — 3079F DIAST BP 80-89 MM HG: CPT | Performed by: INTERNAL MEDICINE

## 2025-03-20 PROCEDURE — 99214 OFFICE O/P EST MOD 30 MIN: CPT | Performed by: INTERNAL MEDICINE

## 2025-03-20 PROCEDURE — G2211 COMPLEX E/M VISIT ADD ON: HCPCS | Performed by: INTERNAL MEDICINE

## 2025-03-20 RX ORDER — TIRZEPATIDE 2.5 MG/.5ML
INJECTION, SOLUTION SUBCUTANEOUS WEEKLY
COMMUNITY

## 2025-03-20 NOTE — PROGRESS NOTES
[facility] HEART [place]    3/20/2025    Faheem Gomez (:  1960) is a 65 y.o. male presenting for cardiovascular follow up for mild CAD.     Referring Provider: Jose Mcwilliams MD    HISTORY: Mr. Gomez was recently seen by our EP service with Dr Murcia for new onset A-fib. Abnormal stress testing completed in , insurance required coronary CTA prior to cardiac cath. CTA revealed thrombus in the left atrial appendage. Echo and PREETI completed with no evidence of Thrombus.     Today he is feeling good. S/p A-fib ablation with Dr Murcia in December, he was feeling goo after that until he came down with the flu. Denies shortness of breath, minimal swelling in his legs which is not new for him. He takes furosemide which helps.   He does complain of occasional pain in between his shoulder blades, he can feel it more so with palpation and/or movement for the past week, last night seemed to bother him more. No exertional symptoms.         REVIEW OF SYSTEMS:  A complete review of systems was reviewed and is negative except as noted in the history of present illness.    Prior to Visit Medications    Medication Sig Taking? Authorizing Provider   Tirzepatide (MOUNJARO) 2.5 MG/0.5ML SOAJ Inject into the skin once a week Yes My Garces MD   senna-docusate (SENNA-S) 8.6-50 MG per tablet Take 1 tablet by mouth daily Yes My Garces MD   metoprolol tartrate (LOPRESSOR) 25 MG tablet Take 0.5 tablets by mouth 2 times daily Yes Wes Murcia MD   rosuvastatin (CRESTOR) 5 MG tablet Take 1 tablet by mouth daily Yes Jose Stern MD   furosemide (LASIX) 20 MG tablet Take 1 tablet by mouth daily Yes Jose tSern MD   montelukast (SINGULAIR) 10 MG tablet Take 1 tablet by mouth daily Yes Dionte Hernandez MD   ELIQUIS 5 MG TABS tablet Take 1 tablet by mouth 2 times daily Yes Kirk Goetz, YESENIA - CNP   Omega-3 Fatty Acids (FISH OIL) 1000 MG capsule Take 1 capsule by mouth nightly Yes Provider,

## 2025-03-20 NOTE — PATIENT INSTRUCTIONS
You can try to take lasix around 1 or 2 pm   Continue present medications  UA and CBC ordered for now  Follow up in 1 year

## 2025-03-21 ENCOUNTER — RESULTS FOLLOW-UP (OUTPATIENT)
Age: 65
End: 2025-03-21

## 2025-03-21 LAB
BASOPHILS # BLD: 0 K/UL (ref 0–0.2)
BASOPHILS NFR BLD: 0.7 %
BILIRUB UR QL STRIP.AUTO: NEGATIVE
CLARITY UR: CLEAR
COLOR UR: YELLOW
DEPRECATED RDW RBC AUTO: 14.5 % (ref 12.4–15.4)
EOSINOPHIL # BLD: 0.2 K/UL (ref 0–0.6)
EOSINOPHIL NFR BLD: 2.6 %
GLUCOSE UR STRIP.AUTO-MCNC: >=1000 MG/DL
HCT VFR BLD AUTO: 41.8 % (ref 40.5–52.5)
HGB BLD-MCNC: 14.3 G/DL (ref 13.5–17.5)
HGB UR QL STRIP.AUTO: NEGATIVE
KETONES UR STRIP.AUTO-MCNC: ABNORMAL MG/DL
LEUKOCYTE ESTERASE UR QL STRIP.AUTO: NEGATIVE
LYMPHOCYTES # BLD: 1.1 K/UL (ref 1–5.1)
LYMPHOCYTES NFR BLD: 17 %
MCH RBC QN AUTO: 30.9 PG (ref 26–34)
MCHC RBC AUTO-ENTMCNC: 34.3 G/DL (ref 31–36)
MCV RBC AUTO: 90.1 FL (ref 80–100)
MONOCYTES # BLD: 0.6 K/UL (ref 0–1.3)
MONOCYTES NFR BLD: 8.8 %
NEUTROPHILS # BLD: 4.8 K/UL (ref 1.7–7.7)
NEUTROPHILS NFR BLD: 70.9 %
NITRITE UR QL STRIP.AUTO: NEGATIVE
PH UR STRIP.AUTO: 6 [PH] (ref 5–8)
PLATELET # BLD AUTO: 252 K/UL (ref 135–450)
PMV BLD AUTO: 8 FL (ref 5–10.5)
PROT UR STRIP.AUTO-MCNC: NEGATIVE MG/DL
RBC # BLD AUTO: 4.64 M/UL (ref 4.2–5.9)
SP GR UR STRIP.AUTO: 1.03 (ref 1–1.03)
UA DIPSTICK W REFLEX MICRO PNL UR: ABNORMAL
URN SPEC COLLECT METH UR: ABNORMAL
UROBILINOGEN UR STRIP-ACNC: 0.2 E.U./DL
WBC # BLD AUTO: 6.7 K/UL (ref 4–11)

## 2025-04-15 ENCOUNTER — HOSPITAL ENCOUNTER (OUTPATIENT)
Dept: GENERAL RADIOLOGY | Age: 65
Discharge: HOME | End: 2025-04-15
Payer: COMMERCIAL

## 2025-04-15 ENCOUNTER — HOSPITAL ENCOUNTER (OUTPATIENT)
Age: 65
Discharge: HOME | End: 2025-04-15
Payer: COMMERCIAL

## 2025-04-15 DIAGNOSIS — R05.1 ACUTE COUGH: ICD-10-CM

## 2025-04-15 PROCEDURE — 71046 X-RAY EXAM CHEST 2 VIEWS: CPT

## 2025-05-02 RX ORDER — FUROSEMIDE 20 MG/1
20 TABLET ORAL DAILY
Qty: 90 TABLET | Refills: 3 | Status: SHIPPED | OUTPATIENT
Start: 2025-05-02

## 2025-05-02 RX ORDER — ROSUVASTATIN CALCIUM 5 MG/1
5 TABLET, COATED ORAL DAILY
Qty: 90 TABLET | Refills: 3 | Status: SHIPPED | OUTPATIENT
Start: 2025-05-02

## 2025-05-02 NOTE — TELEPHONE ENCOUNTER
Requested Prescriptions     Pending Prescriptions Disp Refills    rosuvastatin (CRESTOR) 5 MG tablet 90 tablet 3     Sig: Take 1 tablet by mouth daily    furosemide (LASIX) 20 MG tablet 90 tablet 1     Sig: Take 1 tablet by mouth daily      Last OV:  3/18/2025 MXA    Next OV: 12/02/2025 MXA    Last Labs: 03/18/2025 BMP, No results found for: \"CHOL\", \"TRIG\", \"HDL\", \"LDL\", \"VLDL\", \"CHOLHDLRATIO\"      Last Filled: 01/23/2025 [facility]

## 2025-06-06 RX ORDER — APIXABAN 5 MG/1
5 TABLET, FILM COATED ORAL 2 TIMES DAILY
Qty: 180 TABLET | Refills: 3 | Status: SHIPPED | OUTPATIENT
Start: 2025-06-06

## 2025-06-06 NOTE — TELEPHONE ENCOUNTER
Requested Prescriptions     Pending Prescriptions Disp Refills    ELIQUIS 5 MG TABS tablet 180 tablet 3     Sig: Take 1 tablet by mouth 2 times daily      LAST OV: 3/18/2025   NEXT OV: 12/2/2025

## 2025-06-16 RX ORDER — METOPROLOL TARTRATE 25 MG/1
12.5 TABLET, FILM COATED ORAL 2 TIMES DAILY
Qty: 90 TABLET | Refills: 3 | Status: SHIPPED | OUTPATIENT
Start: 2025-06-16

## 2025-06-24 ENCOUNTER — OFFICE VISIT (OUTPATIENT)
Dept: PULMONOLOGY | Age: 65
End: 2025-06-24
Payer: COMMERCIAL

## 2025-06-24 VITALS
OXYGEN SATURATION: 96 % | SYSTOLIC BLOOD PRESSURE: 120 MMHG | BODY MASS INDEX: 38.36 KG/M2 | DIASTOLIC BLOOD PRESSURE: 80 MMHG | HEART RATE: 56 BPM | HEIGHT: 76 IN | WEIGHT: 315 LBS

## 2025-06-24 DIAGNOSIS — E66.813 CLASS 3 SEVERE OBESITY WITH SERIOUS COMORBIDITY AND BODY MASS INDEX (BMI) OF 40.0 TO 44.9 IN ADULT, UNSPECIFIED OBESITY TYPE (HCC): ICD-10-CM

## 2025-06-24 DIAGNOSIS — I48.91 NEW ONSET ATRIAL FIBRILLATION (HCC): ICD-10-CM

## 2025-06-24 DIAGNOSIS — I10 HYPERTENSION, UNSPECIFIED TYPE: ICD-10-CM

## 2025-06-24 DIAGNOSIS — J30.1 NON-SEASONAL ALLERGIC RHINITIS DUE TO POLLEN: ICD-10-CM

## 2025-06-24 DIAGNOSIS — G47.33 OSA (OBSTRUCTIVE SLEEP APNEA): Primary | ICD-10-CM

## 2025-06-24 PROCEDURE — 99214 OFFICE O/P EST MOD 30 MIN: CPT | Performed by: INTERNAL MEDICINE

## 2025-06-24 PROCEDURE — G2211 COMPLEX E/M VISIT ADD ON: HCPCS | Performed by: INTERNAL MEDICINE

## 2025-06-24 PROCEDURE — 3074F SYST BP LT 130 MM HG: CPT | Performed by: INTERNAL MEDICINE

## 2025-06-24 PROCEDURE — 3079F DIAST BP 80-89 MM HG: CPT | Performed by: INTERNAL MEDICINE

## 2025-06-24 ASSESSMENT — SLEEP AND FATIGUE QUESTIONNAIRES
HOW LIKELY ARE YOU TO NOD OFF OR FALL ASLEEP WHILE WATCHING TV: MODERATE CHANCE OF DOZING
HOW LIKELY ARE YOU TO NOD OFF OR FALL ASLEEP IN A CAR, WHILE STOPPED FOR A FEW MINUTES IN TRAFFIC: WOULD NEVER DOZE
HOW LIKELY ARE YOU TO NOD OFF OR FALL ASLEEP WHILE LYING DOWN TO REST IN THE AFTERNOON WHEN CIRCUMSTANCES PERMIT: MODERATE CHANCE OF DOZING
ESS TOTAL SCORE: 8
HOW LIKELY ARE YOU TO NOD OFF OR FALL ASLEEP WHEN YOU ARE A PASSENGER IN A CAR FOR AN HOUR WITHOUT A BREAK: SLIGHT CHANCE OF DOZING
HOW LIKELY ARE YOU TO NOD OFF OR FALL ASLEEP WHILE SITTING AND TALKING TO SOMEONE: WOULD NEVER DOZE
HOW LIKELY ARE YOU TO NOD OFF OR FALL ASLEEP WHILE SITTING AND READING: SLIGHT CHANCE OF DOZING
HOW LIKELY ARE YOU TO NOD OFF OR FALL ASLEEP WHILE SITTING QUIETLY AFTER LUNCH WITHOUT ALCOHOL: SLIGHT CHANCE OF DOZING
HOW LIKELY ARE YOU TO NOD OFF OR FALL ASLEEP WHILE SITTING INACTIVE IN A PUBLIC PLACE: SLIGHT CHANCE OF DOZING

## 2025-06-24 NOTE — PROGRESS NOTES
GENITOURINARY: Deferred.   MUSCULOSKELETAL: No tenderness to palpation of the axial skeleton. There is no clubbing. No cyanosis. No edema of the lower extremities. No joint deformities seen.   SKIN OF BODY: No rash or jaundice.  PSYCHIATRIC EVALUATION: Normal affect. Patient answers questions appropriately.   HEMATOLOGIC/LYMPHATIC/ IMMUNOLOGIC: No palpable lymphadenopathy.  NEUROLOGIC: Alert and oriented x 3.Groslly non-focal. Motor strength is 5+/5 in all muscle groups.          LABS:        Latest Ref Rng & Units 3/20/2025     3:14 PM 3/18/2025     9:55 AM 12/6/2024     7:15 AM   Lab Summary   WBC 4.0 - 11.0 K/uL 6.7   7.4    Hgb 13.5 - 17.5 g/dL 14.3   15.3    Hct 40.5 - 52.5 % 41.8   43.5    Platelets 135 - 450 K/uL 252   243    Sodium 136 - 145 mmol/L  141  140    Potassium 3.5 - 5.1 mmol/L  3.9  3.5    BUN 7 - 20 mg/dL  13  11    Creatinine 0.8 - 1.3 mg/dL  0.7  0.9    Glucose 70 - 99 mg/dL  180  207    Calcium 8.3 - 10.6 mg/dL  9.3  9.3          IMAGING:    No new chest imaging for me to review.      Pulmonary Functions Testing Results:    Split-night sleep study done on 1/8/2023  Diagnostic portion  Overall AHI 5.9  Supine AHI: 17.4  REM AHI: 8    Treatment portion  Auto CPAP 7 to 14 cm H2O with Airtouch N20 large    CPAP compliance summary     3/15/2025 -6/12/2025 4/15/2024 -10/20/2024 10/10/2023 -4/2/2024 4/10/2023 -10/9/2023 2/21/2023-4/4/2023   Days with device usage 75/90 days 170/189 days 162/176 days 163/183 days 41/43 days   Average Usage 4 hours 51 minutes 5 hours 35 minutes 5 hours 29 minutes 5 hours 10 minutes 4 hours 35 minutes   Days with device usage >4hrs 59% 74% 75% 69% 72%   Large Leak per night 61.3 L/min 53.6 L/min 39.3 L/min 48.4 L/min 54.2 L/min   AHI 0.7 0.5 0.6 0.8 1.8   CPAP settings Auto CPAP 7 to 14 cm H2O Auto CPAP 7 to 14 cm H2O Auto CPAP 7 to 14 cm H2O Auto CPAP 7 to 14 cm H2O Auto CPAP 7 to 14 cm H2O   90th percentile pressure 8.3 9.0 9.4 9.7 9.9   Mask Nasal mask Nasal mask

## 2025-07-07 NOTE — TELEPHONE ENCOUNTER
Spoke with pt. He is scheduled for 12-15-22 @ 1:30pm with RMM (ok per Abril CHERRY).  Reviewed all preps and sent instructions through [employer] per his request. 27-Jun-2022 18:08 Warm

## (undated) DEVICE — CORDIS 6 FR 23CM SHEATH

## (undated) DEVICE — CORDIS 8FR 23CM SHEATH

## (undated) DEVICE — 1 X VERSACROSS CONNECT TRANSSEPTAL DILATOR;  1 X VERSACROSS RF WIRE (INCLUDING 1 X CONNECTOR CABLE (SINGLE USE)): Brand: VERSACROSS CONNECT ACCESS SOLUTION FOR FARADRIVE

## (undated) DEVICE — CATHETER ULTRASOUND NUVISION ICE OD10 FR

## (undated) DEVICE — GUIDEWIRE VASC L180CM 0035IN 15MM J ROSEN TIP PTFE FIX COR

## (undated) DEVICE — PERCUTANEOUS ENTRY THINWALL NEEDLE  ONE-PART: Brand: COOK

## (undated) DEVICE — CATHETER EP 7FR L115CM 2-6-2MM SPC 22 ELECTRD F CRV

## (undated) DEVICE — PROBE COVER KIT: Brand: MEDLINE INDUSTRIES, INC.

## (undated) DEVICE — PERCLOSE™ PROSTYLE™ SUTURE-MEDIATED CLOSURE AND REPAIR SYSTEM: Brand: PERCLOSE™ PROSTYLE™

## (undated) DEVICE — STEERABLE SHEATH CLEAR: Brand: FARADRIVE™

## (undated) DEVICE — DILATOR: Brand: COOK

## (undated) DEVICE — CORDIS J-WIRE 80CM

## (undated) DEVICE — CATH LAB PACK: Brand: MEDLINE INDUSTRIES, INC.

## (undated) DEVICE — PULSED FIELD ABLATION CATHETER: Brand: FARAWAVE™

## (undated) DEVICE — CATHETER EP 5FR L110CM ELECTRD TIP L1MM SPC 2-5-2MM M CRV

## (undated) DEVICE — AMPLATZ SUPER STIFF 0.35 X 180 WIRE

## (undated) DEVICE — TUBING PMP FOR CARTO SYS SMARTABLATE

## (undated) DEVICE — 3M™ RED DOT™ REPOSITIONABLE MONITORING ELECTRODE 2670-5, 5/BAG, 200/CASE, 54/PLT: Brand: RED DOT™

## (undated) DEVICE — PATCH REF EXT FOR CARTO 3 SYS (EA = 6 PACKS)

## (undated) DEVICE — ELECTRODE PT RET AD L9FT HI MOIST COND ADH HYDRGEL CORDED

## (undated) DEVICE — Device

## (undated) DEVICE — PAD, DEFIB, ADULT, RADIOTRANS, PHYSIO: Brand: MEDLINE